# Patient Record
Sex: MALE | Race: WHITE | Employment: FULL TIME | URBAN - METROPOLITAN AREA
[De-identification: names, ages, dates, MRNs, and addresses within clinical notes are randomized per-mention and may not be internally consistent; named-entity substitution may affect disease eponyms.]

---

## 2019-09-29 SDOH — HEALTH STABILITY: MENTAL HEALTH: HOW OFTEN DO YOU HAVE A DRINK CONTAINING ALCOHOL?: NEVER

## 2021-04-02 ENCOUNTER — OFFICE VISIT (OUTPATIENT)
Dept: FAMILY MEDICINE CLINIC | Age: 51
End: 2021-04-02
Payer: COMMERCIAL

## 2021-04-02 VITALS
HEART RATE: 83 BPM | TEMPERATURE: 97 F | DIASTOLIC BLOOD PRESSURE: 90 MMHG | OXYGEN SATURATION: 97 % | BODY MASS INDEX: 27.89 KG/M2 | HEIGHT: 71 IN | SYSTOLIC BLOOD PRESSURE: 130 MMHG | WEIGHT: 199.2 LBS

## 2021-04-02 DIAGNOSIS — E78.5 HYPERLIPIDEMIA, UNSPECIFIED HYPERLIPIDEMIA TYPE: ICD-10-CM

## 2021-04-02 DIAGNOSIS — E78.5 HYPERLIPIDEMIA, UNSPECIFIED HYPERLIPIDEMIA TYPE: Primary | ICD-10-CM

## 2021-04-02 DIAGNOSIS — I10 ESSENTIAL HYPERTENSION: ICD-10-CM

## 2021-04-02 LAB
A/G RATIO: 1.7 (ref 1.1–2.2)
ALBUMIN SERPL-MCNC: 4.5 G/DL (ref 3.4–5)
ALP BLD-CCNC: 36 U/L (ref 40–129)
ALT SERPL-CCNC: 32 U/L (ref 10–40)
ANION GAP SERPL CALCULATED.3IONS-SCNC: 7 MMOL/L (ref 3–16)
AST SERPL-CCNC: 30 U/L (ref 15–37)
BILIRUB SERPL-MCNC: 0.4 MG/DL (ref 0–1)
BILIRUBIN URINE: NEGATIVE
BLOOD, URINE: NEGATIVE
BUN BLDV-MCNC: 14 MG/DL (ref 7–20)
CALCIUM SERPL-MCNC: 9.2 MG/DL (ref 8.3–10.6)
CHLORIDE BLD-SCNC: 102 MMOL/L (ref 99–110)
CHOLESTEROL, TOTAL: 195 MG/DL (ref 0–199)
CLARITY: CLEAR
CO2: 30 MMOL/L (ref 21–32)
COLOR: YELLOW
CREAT SERPL-MCNC: 1 MG/DL (ref 0.9–1.3)
EPITHELIAL CELLS, UA: 0 /HPF (ref 0–5)
GFR AFRICAN AMERICAN: >60
GFR NON-AFRICAN AMERICAN: >60
GLOBULIN: 2.7 G/DL
GLUCOSE BLD-MCNC: 78 MG/DL (ref 70–99)
GLUCOSE URINE: NEGATIVE MG/DL
HDLC SERPL-MCNC: 27 MG/DL (ref 40–60)
HYALINE CASTS: 0 /LPF (ref 0–8)
KETONES, URINE: NEGATIVE MG/DL
LDL CHOLESTEROL CALCULATED: 155 MG/DL
LEUKOCYTE ESTERASE, URINE: NEGATIVE
MICROSCOPIC EXAMINATION: NORMAL
NITRITE, URINE: NEGATIVE
PH UA: 6 (ref 5–8)
POTASSIUM SERPL-SCNC: 5.2 MMOL/L (ref 3.5–5.1)
PROTEIN UA: NEGATIVE MG/DL
RBC UA: 3 /HPF (ref 0–4)
SODIUM BLD-SCNC: 139 MMOL/L (ref 136–145)
SPECIFIC GRAVITY UA: 1.03 (ref 1–1.03)
TOTAL PROTEIN: 7.2 G/DL (ref 6.4–8.2)
TRIGL SERPL-MCNC: 63 MG/DL (ref 0–150)
URINE TYPE: NORMAL
UROBILINOGEN, URINE: 0.2 E.U./DL
VLDLC SERPL CALC-MCNC: 13 MG/DL
WBC UA: 1 /HPF (ref 0–5)

## 2021-04-02 PROCEDURE — 93000 ELECTROCARDIOGRAM COMPLETE: CPT | Performed by: FAMILY MEDICINE

## 2021-04-02 PROCEDURE — 99214 OFFICE O/P EST MOD 30 MIN: CPT | Performed by: FAMILY MEDICINE

## 2021-04-02 RX ORDER — LISINOPRIL AND HYDROCHLOROTHIAZIDE 12.5; 1 MG/1; MG/1
1 TABLET ORAL DAILY
Qty: 90 TABLET | Refills: 1 | Status: SHIPPED | OUTPATIENT
Start: 2021-04-02 | End: 2021-10-21 | Stop reason: SDUPTHER

## 2021-04-02 RX ORDER — ATORVASTATIN CALCIUM 40 MG/1
40 TABLET, FILM COATED ORAL DAILY
Qty: 90 TABLET | Refills: 1 | Status: SHIPPED | OUTPATIENT
Start: 2021-04-02 | End: 2021-10-21 | Stop reason: SDUPTHER

## 2021-04-02 ASSESSMENT — PATIENT HEALTH QUESTIONNAIRE - PHQ9
2. FEELING DOWN, DEPRESSED OR HOPELESS: 0
SUM OF ALL RESPONSES TO PHQ QUESTIONS 1-9: 0
1. LITTLE INTEREST OR PLEASURE IN DOING THINGS: 0

## 2021-04-02 NOTE — PROGRESS NOTES
2021     Dom Haines      Chief Complaint   Patient presents with    Annual Exam     pt states that his BP has been high at work and it was 159/85 and he is concerned since his mom passed away at 46 with heart disease . MELIZA Rios is a 52 y.o. male who presents today with the followin. Hyperlipidemia, unspecified hyperlipidemia type    2. Essential hypertension      He has not been here for years  Has had his blood pressure taken a couple times outside the office and has been high in the 150/90+ range  He concerned because his mother just  at age 47 of an MI  I had the patient on antihypertensive medicine and statin therapy in the past but he has not been compliant and did come back for follow-up      REVIEW OF SYMPTOMS    Review of Systems   Constitutional: Negative for activity change and unexpected weight change. Cardiovascular: Negative for chest pain.        PAST MEDICAL HISTORY  Past Medical History:   Diagnosis Date    Hyperlipidemia        FAMILY HISTORY  Family History   Problem Relation Age of Onset    Diabetes Mother     Heart Attack Mother     Diabetes Father        SOCIAL HISTORY  Social History     Socioeconomic History    Marital status:      Spouse name: None    Number of children: None    Years of education: None    Highest education level: None   Occupational History    Occupation: electrian   Social Needs    Financial resource strain: None    Food insecurity     Worry: None     Inability: None    Transportation needs     Medical: None     Non-medical: None   Tobacco Use    Smoking status: Never Smoker    Smokeless tobacco: Never Used   Substance and Sexual Activity    Alcohol use: Never     Frequency: Never    Drug use: Never    Sexual activity: None   Lifestyle    Physical activity     Days per week: None     Minutes per session: None    Stress: None   Relationships    Social connections     Talks on phone: None     Gets together: None Attends Pentecostalism service: None     Active member of club or organization: None     Attends meetings of clubs or organizations: None     Relationship status: None    Intimate partner violence     Fear of current or ex partner: None     Emotionally abused: None     Physically abused: None     Forced sexual activity: None   Other Topics Concern    None   Social History Narrative    None        SURGICAL HISTORY  History reviewed. No pertinent surgical history. CURRENT MEDICATIONS  Current Outpatient Medications   Medication Sig Dispense Refill    lisinopril-hydroCHLOROthiazide (PRINZIDE;ZESTORETIC) 10-12.5 MG per tablet Take 1 tablet by mouth daily 90 tablet 1    atorvastatin (LIPITOR) 40 MG tablet Take 1 tablet by mouth daily 90 tablet 1     No current facility-administered medications for this visit. ALLERGIES  No Known Allergies    PHYSICAL EXAM    BP (!) 130/90   Pulse 83   Temp 97 °F (36.1 °C)   Ht 5' 11\" (1.803 m)   Wt 199 lb 3.2 oz (90.4 kg)   SpO2 97%   BMI 27.78 kg/m²     Physical Exam  Vitals signs and nursing note reviewed. Constitutional:       Appearance: Normal appearance. Cardiovascular:      Rate and Rhythm: Normal rate. Pulmonary:      Effort: Pulmonary effort is normal.   Neurological:      Mental Status: He is alert. EKG in the office shows a left axis with no ischemic changes      ASSESSMENT and Shanae Charles was seen today for annual exam.    Diagnoses and all orders for this visit:    Hyperlipidemia, unspecified hyperlipidemia type  -     Lipid Panel; Future    Essential hypertension  -     Comprehensive Metabolic Panel; Future  -     Urinalysis with Microscopic  -     22321 - TX ELECTROCARDIOGRAM, COMPLETE  -     EKG 12 lead; Future  -     EKG 12 lead    Other orders  -     lisinopril-hydroCHLOROthiazide (PRINZIDE;ZESTORETIC) 10-12.5 MG per tablet; Take 1 tablet by mouth daily  -     atorvastatin (LIPITOR) 40 MG tablet;  Take 1 tablet by mouth daily    Start patient on lisinopril hydrochlorothiazide  Start atorvastatin  Follow-up in 4 weeks  Recommend Covid vaccine now  Baseline chemistry profile lipids done today urinalysis pending  No follow-ups on file. Electronically signed by Arron Wilder MD on 4/2/2021    Please note that this chart was generated using dragon dictation software. Although every effort was made to ensure the accuracy of this automated transcription, some errors in transcription may have occurred.

## 2021-04-19 ENCOUNTER — OFFICE VISIT (OUTPATIENT)
Dept: FAMILY MEDICINE CLINIC | Age: 51
End: 2021-04-19
Payer: COMMERCIAL

## 2021-04-19 ENCOUNTER — HOSPITAL ENCOUNTER (OUTPATIENT)
Age: 51
Setting detail: SPECIMEN
Discharge: HOME | End: 2021-04-19
Payer: COMMERCIAL

## 2021-04-19 VITALS — TEMPERATURE: 97.3 F | OXYGEN SATURATION: 98 % | HEART RATE: 78 BPM

## 2021-04-19 DIAGNOSIS — J02.9 SORE THROAT: ICD-10-CM

## 2021-04-19 DIAGNOSIS — R51.9 GENERALIZED HEADACHE: ICD-10-CM

## 2021-04-19 DIAGNOSIS — R43.2 LOSS OF TASTE: ICD-10-CM

## 2021-04-19 DIAGNOSIS — R09.81 NASAL CONGESTION: Primary | ICD-10-CM

## 2021-04-19 DIAGNOSIS — R43.0 LOSS OF SMELL: ICD-10-CM

## 2021-04-19 LAB
S PYO AG THROAT QL: NORMAL
SARS-COV-2: DETECTED
SOURCE: ABNORMAL

## 2021-04-19 PROCEDURE — U0003 INFECTIOUS AGENT DETECTION BY NUCLEIC ACID (DNA OR RNA); SEVERE ACUTE RESPIRATORY SYNDROME CORONAVIRUS 2 (SARS-COV-2) (CORONAVIRUS DISEASE [COVID-19]), AMPLIFIED PROBE TECHNIQUE, MAKING USE OF HIGH THROUGHPUT TECHNOLOGIES AS DESCRIBED BY CMS-2020-01-R: HCPCS

## 2021-04-19 PROCEDURE — 99213 OFFICE O/P EST LOW 20 MIN: CPT | Performed by: NURSE PRACTITIONER

## 2021-04-19 PROCEDURE — U0005 INFEC AGEN DETEC AMPLI PROBE: HCPCS

## 2021-04-19 PROCEDURE — 87880 STREP A ASSAY W/OPTIC: CPT | Performed by: NURSE PRACTITIONER

## 2021-04-19 NOTE — PROGRESS NOTES
4/19/21  Dave JESUS MANUEL Zaki  1970    FLU/COVID-19 CLINIC EVALUATION    HPI SYMPTOMS:    Employer:    [] Fevers  [] Chills  [] Cough  [] Coughing up blood  [] Chest Congestion  [x] Nasal Congestion  [] Feeling short of breath  [] Sometimes  [] Frequently  [] All the time  [] Chest pain  [x] Headaches  [x]Tolerable  [] Severe  [x] Sore throat  [] Muscle aches  [] Nausea  [] Vomiting  []Unable to keep fluids down  [] Diarrhea  []Severe    [x] OTHER SYMPTOMS:  Loss of taste and smell  Symptom Duration:   [] 1  [] 2   [] 3   [] 4    [x] 5   [] 6   [] 7   [] 8   [] 9   [] 10   [] 11   [] 12   [] 13   [] 14   [] Longer than 14 days    Symptom course:   [] Worsening     [x] Stable     [] Improving    RISK FACTORS:    [] Pregnant or possibly pregnant  [] Age over 61  [] Diabetes  [] Heart disease  [] Asthma  [] COPD/Other chronic lung diseases  [] Active Cancer  [] On Chemotherapy  [] Taking oral steroids  [] History Lymphoma/Leukemia  [] Close contact with a lab confirmed COVID-19 patient within 14 days of symptom onset  [] History of travel from affected geographical areas within 14 days of symptom onset       VITALS:  There were no vitals filed for this visit. TESTS:    POCT FLU:  [] Positive     []Negative    ASSESSMENT:    [] Flu  [] Possible COVID-19  [] Strep    PLAN:    [] Discharge home with written instructions for:  [] Flu management  [] Possible COVID-19 infection with self-quarantine and management of symptoms  [] Follow-up with primary care physician or emergency department if worsens  [] Evaluation per physician/NP/PA in clinic  [] Sent to ER       An  electronic signature was used to authenticate this note.      --Stefanie Mejía LPN on 2/28/6618 at 34:91 AM

## 2021-04-19 NOTE — PATIENT INSTRUCTIONS
Your COVID 19 test can take 3-5 days for the results to come back. We ask that you make a Mychart page and view your test results this way. You will need to Self quarantine until you know your results. Increase fluids and rest  Saline nasal spray as needed for nasal congestion  Warm salt gargles as needed for throat discomfort  Monitor temperature twice a day  Tylenol as needed for fevers and/or discomfort. Big deep breaths periodically throughout the day  Regular Mucinex over the counter as needed for chest congestion  If symptoms worsen -Go to the ER. Follow up with your primary care provider      To Whom it May Concern:    Alexei Parish was tested for COVID-19 4/19/2021. He/she must stay home until test results are back. If test is positive, he/she must quarantine for a total of 10 days starting from day one of symptom onset. He/she must also be fever-free for 24 hours at that time, and also have improvement in symptoms. We do not recommend retesting as patients may continue to test positive for months even though no longer contagious. It is suggested you call [facility] ([facility]) or [address] [place] [address] with any questions regarding quarantine timeframe/return to work/school details.

## 2021-04-19 NOTE — PROGRESS NOTES
4/19/2021    HPI:  Chief complaint and history of present illness as per medical assistant/nurse documented today in the Flu/COVID-19 clinic. Patient is here with complaints of nasal congestion, headache, sore throat, and loss of smell/taste x 5 days. MEDICATIONS:  Prior to Visit Medications    Medication Sig Taking? Authorizing Provider   lisinopril-hydroCHLOROthiazide (PRINZIDE;ZESTORETIC) 10-12.5 MG per tablet Take 1 tablet by mouth daily  José Manuel Lopez MD   atorvastatin (LIPITOR) 40 MG tablet Take 1 tablet by mouth daily  José Manuel Lopez MD       No Known Allergies,   Past Medical History:   Diagnosis Date    Hyperlipidemia    , History reviewed. No pertinent surgical history. ,   Social History     Tobacco Use    Smoking status: Never Smoker    Smokeless tobacco: Never Used   Substance Use Topics    Alcohol use: Never     Frequency: Never    Drug use: Never   ,   Family History   Problem Relation Age of Onset    Diabetes Mother     Heart Attack Mother     Diabetes Father    ,   There is no immunization history on file for this patient.,   Health Maintenance   Topic Date Due    Hepatitis C screen  Never done    HIV screen  Never done    COVID-19 Vaccine (1) Never done    DTaP/Tdap/Td vaccine (1 - Tdap) Never done    Flu vaccine (Season Ended) 09/01/2021    Lipid screen  04/02/2022    Potassium monitoring  04/02/2022    Creatinine monitoring  04/02/2022    Hepatitis A vaccine  Aged Out    Hepatitis B vaccine  Aged Out    Hib vaccine  Aged Out    Meningococcal (ACWY) vaccine  Aged Out    Pneumococcal 0-64 years Vaccine  Aged Out       PHYSICAL EXAM:  Physical Exam  Constitutional:       Appearance: Normal appearance. HENT:      Head: Normocephalic. Right Ear: Tympanic membrane, ear canal and external ear normal.      Left Ear: Tympanic membrane, ear canal and external ear normal.      Nose: Congestion (slight) present. Mouth/Throat:      Lips: Pink.       Mouth: Mucous membranes are moist.      Pharynx: Posterior oropharyngeal erythema present. Neck:      Musculoskeletal: Neck supple. Cardiovascular:      Rate and Rhythm: Normal rate and regular rhythm. Heart sounds: Normal heart sounds. Pulmonary:      Effort: Pulmonary effort is normal.      Breath sounds: Normal breath sounds. Skin:     General: Skin is warm and dry. Neurological:      Mental Status: He is alert and oriented to person, place, and time. Psychiatric:         Behavior: Behavior normal.         ASSESSMENT/PLAN:  1. Nasal congestion  Your COVID 19 test can take 3-5 days for the results to come back. We ask that you make a Mychart page and view your test results this way. You will need to Self quarantine until you know your results. Increase fluids and rest  Saline nasal spray as needed for nasal congestion  Warm salt gargles as needed for throat discomfort  Monitor temperature twice a day  Tylenol as needed for fevers and/or discomfort. Big deep breaths periodically throughout the day  Regular Mucinex over the counter as needed for chest congestion  If symptoms worsen -Go to the ER. Follow up with your primary care provider  - COVID-19 Ambulatory    2. Generalized headache  - COVID-19 Ambulatory    3. Sore throat  Strep test is negative. - COVID-19 Ambulatory  - POCT rapid strep A    4. Loss of smell  - COVID-19 Ambulatory    5. Loss of taste  - COVID-19 Ambulatory      FOLLOW-UP:  Return if symptoms worsen or fail to improve.     In addition to other information, the printed after visit summary provided to the patient includes:  [x] COVID-19 Self care instructions  [x] COVID-19 General patient information

## 2021-10-21 ENCOUNTER — OFFICE VISIT (OUTPATIENT)
Dept: FAMILY MEDICINE CLINIC | Age: 51
End: 2021-10-21
Payer: COMMERCIAL

## 2021-10-21 VITALS
HEIGHT: 71 IN | SYSTOLIC BLOOD PRESSURE: 134 MMHG | OXYGEN SATURATION: 97 % | DIASTOLIC BLOOD PRESSURE: 82 MMHG | BODY MASS INDEX: 28.08 KG/M2 | HEART RATE: 73 BPM | WEIGHT: 200.6 LBS

## 2021-10-21 DIAGNOSIS — E78.5 HYPERLIPIDEMIA, UNSPECIFIED HYPERLIPIDEMIA TYPE: ICD-10-CM

## 2021-10-21 DIAGNOSIS — Z11.4 SCREENING FOR HUMAN IMMUNODEFICIENCY VIRUS: ICD-10-CM

## 2021-10-21 DIAGNOSIS — Z11.59 NEED FOR HEPATITIS C SCREENING TEST: ICD-10-CM

## 2021-10-21 DIAGNOSIS — I10 ESSENTIAL HYPERTENSION: Primary | ICD-10-CM

## 2021-10-21 PROCEDURE — 99214 OFFICE O/P EST MOD 30 MIN: CPT | Performed by: PHYSICIAN ASSISTANT

## 2021-10-21 RX ORDER — ATORVASTATIN CALCIUM 40 MG/1
40 TABLET, FILM COATED ORAL DAILY
Qty: 90 TABLET | Refills: 1 | Status: SHIPPED | OUTPATIENT
Start: 2021-10-21

## 2021-10-21 RX ORDER — LISINOPRIL AND HYDROCHLOROTHIAZIDE 12.5; 1 MG/1; MG/1
1 TABLET ORAL DAILY
Qty: 90 TABLET | Refills: 1 | Status: SHIPPED | OUTPATIENT
Start: 2021-10-21

## 2021-10-21 NOTE — PROGRESS NOTES
10/21/2021    Lonnell Meigs Wilcoxon    Chief Complaint   Patient presents with    6 Month Follow-Up    Medication Refill       HPI  History obtained from the patient. Suhas Anderson is a 54 y.o. male who presents today for 6-month follow-up. HTN - Patient is compliant with Lisinopril-Hydrochlorothiazide 10-12.5 mg. Patient denies home blood pressure monitoring. Patient denies HA, vision changes, lightheadedness. HLD - Patient is compliant with Atorvastatin 40 mg. Health maintenance - Patient politely declines colon cancer screening until next year. Patient also politely declines flu and Tdap vaccines at this time. The 10-year ASCVD risk score (Luigi Bell., et al., 2013) is: 6.3%    Values used to calculate the score:      Age: 54 years      Sex: Male      Is Non- : No      Diabetic: No      Tobacco smoker: No      Systolic Blood Pressure: 367 mmHg      Is BP treated: No      HDL Cholesterol: 27 mg/dL      Total Cholesterol: 195 mg/dL    REVIEW OF SYMPTOMS  Review of Systems   Constitutional: Negative for chills and fever. Respiratory: Negative for cough and shortness of breath. Gastrointestinal: Negative for diarrhea and vomiting.      PAST MEDICAL HISTORY  Past Medical History:   Diagnosis Date    Hyperlipidemia        FAMILY HISTORY  Family History   Problem Relation Age of Onset    Diabetes Mother     Heart Attack Mother     Diabetes Father        SOCIAL HISTORY  Social History     Socioeconomic History    Marital status:      Spouse name: Not on file    Number of children: Not on file    Years of education: Not on file    Highest education level: Not on file   Occupational History    Occupation: electrian   Tobacco Use    Smoking status: Never Smoker    Smokeless tobacco: Never Used   Substance and Sexual Activity    Alcohol use: Never    Drug use: Never    Sexual activity: Not on file   Other Topics Concern    Not on file   Social History Narrative    Not on file     Social Determinants of Health     Financial Resource Strain:     Difficulty of Paying Living Expenses:    Food Insecurity:     Worried About Running Out of Food in the Last Year:     [address] in the Last Year:    Transportation Needs:     Lack of Transportation (Medical):  Lack of Transportation (Non-Medical):    Physical Activity:     Days of Exercise per Week:     Minutes of Exercise per Session:    Stress:     Feeling of Stress :    Social Connections:     Frequency of Communication with Friends and Family:     Frequency of Social Gatherings with Friends and Family:     Attends [employer]orationist Services:     Active Member of Clubs or Organizations:     Attends Club or Organization Meetings:     Marital Status:    Intimate Partner Violence:     Fear of Current or Ex-Partner:     Emotionally Abused:     Physically Abused:     Sexually Abused:         SURGICAL HISTORY  No past surgical history on file. CURRENT MEDICATIONS  Current Outpatient Medications   Medication Sig Dispense Refill    atorvastatin (LIPITOR) 40 MG tablet Take 1 tablet by mouth daily 90 tablet 1    lisinopril-hydroCHLOROthiazide (PRINZIDE;ZESTORETIC) 10-12.5 MG per tablet Take 1 tablet by mouth daily 90 tablet 1     No current facility-administered medications for this visit. ALLERGIES  No Known Allergies    RECENT LABS    No results found for: LABA1C  No results found for: EAG    Lab Results   Component Value Date    CHOL 195 04/02/2021     Lab Results   Component Value Date    LDLCALC 155 (H) 04/02/2021       No results found for: WBC, HGB, HCT, MCV, PLT    PHYSICAL EXAM  /82   Pulse 73   Ht 5' 11\" (1.803 m)   Wt 200 lb 9.6 oz (91 kg)   SpO2 97%   BMI 27.98 kg/m²     Physical Exam  Constitutional:       Appearance: Normal appearance. HENT:      Head: Normocephalic and atraumatic. Eyes:      Comments: EOM grossly intact.    Cardiovascular:      Rate and Rhythm: Normal rate and regular rhythm. Heart sounds: No murmur heard. No friction rub. No gallop. Pulmonary:      Effort: Pulmonary effort is normal.      Breath sounds: Normal breath sounds. No wheezing, rhonchi or rales. Skin:     General: Skin is warm and dry. Neurological:      Mental Status: He is alert and oriented to person, place, and time. Comments: Cranial nerves II-XII grossly intact   Psychiatric:         Mood and Affect: Mood normal.         Behavior: Behavior normal.         ASSESSMENT & PLAN  1. Essential hypertension  - Well-controlled. Refilled medications and encouraged home monitoring.  - CBC Auto Differential; Future  - Comprehensive Metabolic Panel; Future  - lisinopril-hydroCHLOROthiazide (PRINZIDE;ZESTORETIC) 10-12.5 MG per tablet; Take 1 tablet by mouth daily  Dispense: 90 tablet; Refill: 1    2. Hyperlipidemia, unspecified hyperlipidemia type  - Discussed ASCVD risk score with patient. Continue on current dosage since his ASCVD score is <7.5%. Refilled medication.  - CBC Auto Differential; Future  - Comprehensive Metabolic Panel; Future  - Lipid Panel; Future  - atorvastatin (LIPITOR) 40 MG tablet; Take 1 tablet by mouth daily  Dispense: 90 tablet; Refill: 1    3. Need for hepatitis C screening test  Health maintenance requirement. Patient is agreeable to screening.   - Hepatitis C Antibody; Future    4. Screening for human immunodeficiency virus  Health maintenance requirement. Patient is agreeable to screening.   - HIV Screen; Future    Return in about 6 months (around 4/21/2022).             Electronically signed by Liz Acosta PA-C on 10/21/2021

## 2021-11-12 DIAGNOSIS — R35.0 FREQUENCY OF MICTURITION: Primary | ICD-10-CM

## 2023-01-11 DIAGNOSIS — I10 ESSENTIAL HYPERTENSION: ICD-10-CM

## 2023-01-11 DIAGNOSIS — E78.5 HYPERLIPIDEMIA, UNSPECIFIED HYPERLIPIDEMIA TYPE: ICD-10-CM

## 2023-01-12 RX ORDER — LISINOPRIL AND HYDROCHLOROTHIAZIDE 12.5; 1 MG/1; MG/1
1 TABLET ORAL DAILY
Qty: 90 TABLET | Refills: 1 | Status: SHIPPED | OUTPATIENT
Start: 2023-01-12

## 2023-01-12 RX ORDER — ATORVASTATIN CALCIUM 40 MG/1
40 TABLET, FILM COATED ORAL DAILY
Qty: 90 TABLET | Refills: 1 | Status: SHIPPED | OUTPATIENT
Start: 2023-01-12

## 2023-01-17 ENCOUNTER — OFFICE VISIT (OUTPATIENT)
Dept: FAMILY MEDICINE CLINIC | Age: 53
End: 2023-01-17
Payer: COMMERCIAL

## 2023-01-17 ENCOUNTER — TELEPHONE (OUTPATIENT)
Dept: FAMILY MEDICINE CLINIC | Age: 53
End: 2023-01-17

## 2023-01-17 VITALS
SYSTOLIC BLOOD PRESSURE: 132 MMHG | OXYGEN SATURATION: 96 % | HEIGHT: 71 IN | DIASTOLIC BLOOD PRESSURE: 86 MMHG | BODY MASS INDEX: 28.36 KG/M2 | HEART RATE: 92 BPM | WEIGHT: 202.6 LBS

## 2023-01-17 DIAGNOSIS — R39.15 URINARY URGENCY: ICD-10-CM

## 2023-01-17 DIAGNOSIS — Z79.890 LONG-TERM CURRENT USE OF TESTOSTERONE REPLACEMENT THERAPY: ICD-10-CM

## 2023-01-17 DIAGNOSIS — I10 ESSENTIAL HYPERTENSION: ICD-10-CM

## 2023-01-17 DIAGNOSIS — Z12.11 SCREENING FOR COLON CANCER: ICD-10-CM

## 2023-01-17 DIAGNOSIS — R07.89 CHEST PRESSURE: ICD-10-CM

## 2023-01-17 DIAGNOSIS — Z82.41 FAMILY HISTORY OF SUDDEN CARDIAC DEATH: ICD-10-CM

## 2023-01-17 DIAGNOSIS — R10.84 GENERALIZED ABDOMINAL PAIN: ICD-10-CM

## 2023-01-17 DIAGNOSIS — E78.5 HYPERLIPIDEMIA, UNSPECIFIED HYPERLIPIDEMIA TYPE: Primary | ICD-10-CM

## 2023-01-17 DIAGNOSIS — F41.9 ANXIETY: ICD-10-CM

## 2023-01-17 DIAGNOSIS — R53.83 FATIGUE, UNSPECIFIED TYPE: ICD-10-CM

## 2023-01-17 DIAGNOSIS — R35.1 NOCTURIA: ICD-10-CM

## 2023-01-17 PROCEDURE — G8427 DOCREV CUR MEDS BY ELIG CLIN: HCPCS | Performed by: PHYSICIAN ASSISTANT

## 2023-01-17 PROCEDURE — G8484 FLU IMMUNIZE NO ADMIN: HCPCS | Performed by: PHYSICIAN ASSISTANT

## 2023-01-17 PROCEDURE — G8419 CALC BMI OUT NRM PARAM NOF/U: HCPCS | Performed by: PHYSICIAN ASSISTANT

## 2023-01-17 PROCEDURE — 99215 OFFICE O/P EST HI 40 MIN: CPT | Performed by: PHYSICIAN ASSISTANT

## 2023-01-17 PROCEDURE — 3017F COLORECTAL CA SCREEN DOC REV: CPT | Performed by: PHYSICIAN ASSISTANT

## 2023-01-17 PROCEDURE — 93000 ELECTROCARDIOGRAM COMPLETE: CPT | Performed by: PHYSICIAN ASSISTANT

## 2023-01-17 PROCEDURE — 3074F SYST BP LT 130 MM HG: CPT | Performed by: PHYSICIAN ASSISTANT

## 2023-01-17 PROCEDURE — 1036F TOBACCO NON-USER: CPT | Performed by: PHYSICIAN ASSISTANT

## 2023-01-17 PROCEDURE — 3078F DIAST BP <80 MM HG: CPT | Performed by: PHYSICIAN ASSISTANT

## 2023-01-17 RX ORDER — HYDROXYZINE HYDROCHLORIDE 25 MG/1
25 TABLET, FILM COATED ORAL EVERY 8 HOURS PRN
Qty: 30 TABLET | Refills: 0 | Status: SHIPPED | OUTPATIENT
Start: 2023-01-17

## 2023-01-17 NOTE — TELEPHONE ENCOUNTER
Patient transferred from [address] - patient states he is having back and abdominal pain and SOB for a week - he states he has been off his high blood pressure medication but just started it back up a few days ago. There is a previous telephone encounter where Marino Rodriguez advises the patient to be scheduled for a same day here in the office but with the chest pain to go to the ED. Patient stated he is at work this morning and would like a late appointment here in the office - he is scheduled today, 1/17/23 @3pm with Norma Chan.

## 2023-01-17 NOTE — PROGRESS NOTES
1/18/2023    Dave Haines    Chief Complaint   Patient presents with    Abdominal Pain     Pt reports abdominal pain rad into lower back , x's 1 week     Shortness of Breath     Pt reports some slight chest pressure with exertion    Other     Pt reports was using injectable steroids x's 4 yrs , recently stopped 3 weeks prior     Hypertension     Pt reports not taking bp meds        HPI  History was obtained from patient.   Dave \"DJ\" is a 52 y.o. male who presents today with multiple concerns.  He is having a lot of anxiety related to his unknown state of health.  He is requesting \"a full work-up going into the new year.\"    Admittedly, patient has been using a few different forms of testosterone 3 times per week for the last 5 years.  He states he has been using \"1 cc of Testosterone Propionate, Nesterone, and \"Tren\" three times weekly.\"  He gets it from a friend.  Has never had his Testosterone checked.  He states that he quit entirely, cold turkey, 3 weeks ago.    Patient also reports that 1 year ago, he was drinking 3 Monster energy drinks daily, cut down to 1 energy drink each morning over the last year.  Quit energy drinks entirely, cold turkey, 3 weeks ago.    Patient states that he was at work last Thursday.  Works at [employer].  He started to feel unwell so his blood pressure was checked and was recorded at 168/105.  He has a well-established history of hyperlipidemia and hypertension.  Previously on atorvastatin 40 mg daily and lisinopril/hydrochlorothiazide 10/12.5 mg daily.  He states that he stopped these meds on his own approximately 1 year ago.  He restarted both medications again 4 days ago.  Blood pressure 132/86 in office today.    Patient states that he goes to the gym and works out pretty regularly.  1 day last week, he was benching and felt like he could not get a good breath in.  He states he was not struggling to breathe but he just did not feel right.  This happened again while  benching the other day. He has had a few days of generalized pain, mostly in the abdomen. He states that this then radiates to his upper back and low back. He describes it as a burning type pain. Denies tasting acid in the throat, excessive throat clearing, globus sensation, or obvious heartburn. No known history of GERD. He denies chest pain, jaw pain, arm pain, weakness of the extremities, lightheadedness, dizziness, nausea, headaches or vision changes. He denies palpitations, racing heart. He reports normal bowel movements without diarrhea, constipation, bloody or black stools. He states that he has been experiencing urinary urgency for months. He also awakens at night to urinate. He denies dysuria, hematuria, penile discharge, flank pain, testicular pain or swelling. He admits fatigue for 1 to 2 years. Denies any known history of electrolyte or vitamin abnormalities, anemia, thyroid disease or testosterone deficiency. He denies fevers, chills, weight loss. His mother  of sudden cardiac death at the age of 46, requests Cariology referral today. He is approaching 54, and this is causing anxiousness. He realizes he should not be using Testosterone that is not prescribed to him. He denies any depressive component. He denies thoughts of harming himself or others. DJ states that he recently got . His wife is really worried about him. She is a good support system for him. She has been begging him to stop testosterone and caffeine. He has never had a colonoscopy, requests GI referral today.     PAST MEDICAL HISTORY  Past Medical History:   Diagnosis Date    Hyperlipidemia        FAMILY HISTORY  Family History   Problem Relation Age of Onset    Diabetes Mother     Heart Attack Mother     Diabetes Father        SOCIAL HISTORY  Social History     Socioeconomic History    Marital status:      Spouse name: None    Number of children: None    Years of education: None Highest education level: None   Occupational History    Occupation: electrian   Tobacco Use    Smoking status: Never    Smokeless tobacco: Never   Substance and Sexual Activity    Alcohol use: Never    Drug use: Never        SURGICAL HISTORY  History reviewed. No pertinent surgical history. CURRENT MEDICATIONS  Current Outpatient Medications   Medication Sig Dispense Refill    hydrOXYzine HCl (ATARAX) 25 MG tablet Take 1 tablet by mouth every 8 hours as needed for Anxiety 30 tablet 0    atorvastatin (LIPITOR) 40 MG tablet Take 1 tablet by mouth daily 90 tablet 1    lisinopril-hydroCHLOROthiazide (PRINZIDE;ZESTORETIC) 10-12.5 MG per tablet Take 1 tablet by mouth daily 90 tablet 1     No current facility-administered medications for this visit. ALLERGIES  No Known Allergies    PHYSICAL EXAM    /86   Pulse 92   Ht 5' 11\" (1.803 m)   Wt 202 lb 9.6 oz (91.9 kg)   SpO2 96%   BMI 28.26 kg/m²     Constitutional:  Well developed, well nourished. Pleasant and cooperative. No acute distress but is a bit anxious at times. HENT:  Normocephalic, atraumatic  Eyes:  PERRLA, conjunctiva normal, no discharge, no scleral icterus  Neck:  No tenderness, supple  Lymphatic:  No lymphadenopathy noted  Cardiovascular:  Normal heart rate, normal rhythm, no murmurs, gallops or rubs  Thorax & Lungs:  Normal breath sounds, no respiratory distress, no wheezing, no rales, no rhonchi  Abdomen:  Soft, mild diffuse tenderness to palpation. No localized areas of tenderness. Special tests negative. No palpable mass or organomegaly. No distention.   Skin:  Warm, dry, no erythema, no rash  Back:  No tenderness, No CVA tenderness  Extremities:  No edema, no tenderness, no cyanosis  Musculoskeletal:  Good range of motion all major joints  Neurologic:  Alert & oriented X 3, normal motor function, normal sensory function, no focal deficits noted  Psychiatric:  Affect normal, mood normal, a bit anxious at times, hopeful    ASSESSMENT & PLAN    Anny Tuttle was seen today for abdominal pain, shortness of breath, other and hypertension. Diagnoses and all orders for this visit:    Hyperlipidemia, unspecified hyperlipidemia type  -     [facility]  -     Lipid Panel; Future    Essential hypertension  -     [facility]  -     CBC with Auto Differential; Future  -     Comprehensive Metabolic Panel; Future    Chest pressure  -     [facility]  -     EKG [address] Performed    Family history of sudden cardiac death  -     [facility]    Anxiety  -     hydrOXYzine HCl (ATARAX) 25 MG tablet; Take 1 tablet by mouth every 8 hours as needed for Anxiety    Nocturia  -     PSA Screening; Future  -     Culture, Urine    Urinary urgency  -     Culture, Urine    Fatigue, unspecified type  -     Testosterone; Future  -     TSH; Future  -     Vitamin B12; Future  -     Vitamin D 25 Hydroxy; Future    Generalized abdominal pain  -     CT ABDOMEN PELVIS W IV CONTRAST Additional Contrast? Radiologist Recommendation; Future    Screening for colon cancer  -     Heladio Lazo MD, Gastroenterology, [place]    History of long-term abuse of testosterone replacement therapy-see HPI. Was using excessively for approximately 5 years. Quit cold turkey 3 weeks ago. Was getting it from a friend.        EKG with concerns for Atrial Flutter  Family hx Sudden Cardiac Death in mother  Refer to Cardiology  Continue atorvastatin 40 mg daily  Continue lisinopril/hydrochlorothiazide 10/12.5 mg daily  Initiate hydroxyzine 25 mg 3 times daily as needed for anxiousness  No working or driving on hydroxyzine due to potential drowsiness  Continue to avoid caffeine and Testosterone  Obtain blood work- CBC, CMP, TSH, vitamin D, vitamin B12, testosterone, PSA, fasting lipid panel  Urine culture pending  Obtain CT abdomen and pelvis  Refer to gastroenterology for colonoscopy, screening for colon cancer  ED for any sudden changes or additional symptoms    There are no discontinued medications. No follow-ups on file. Patient verbalizes understanding with the above plan and is in agreement. Patient will call with  questions or concerns. More than 1 hour was spent on this encounter with at least half of that time spent face-to-face. Please note that this chart was generated using dragon dictation software. Although every effort was made to ensure the accuracy of this automated transcription, some errors in transcription may have occurred.     Electronically signed by Lam Matos PA-C on 1/18/2023

## 2023-01-18 LAB — URINE CULTURE, ROUTINE: NORMAL

## 2023-01-20 DIAGNOSIS — I10 ESSENTIAL HYPERTENSION: ICD-10-CM

## 2023-01-20 DIAGNOSIS — R35.1 NOCTURIA: ICD-10-CM

## 2023-01-20 DIAGNOSIS — E78.5 HYPERLIPIDEMIA, UNSPECIFIED HYPERLIPIDEMIA TYPE: ICD-10-CM

## 2023-01-20 DIAGNOSIS — R53.83 FATIGUE, UNSPECIFIED TYPE: ICD-10-CM

## 2023-01-20 LAB
A/G RATIO: 1.8 (ref 1.1–2.2)
ALBUMIN SERPL-MCNC: 4.8 G/DL (ref 3.4–5)
ALP BLD-CCNC: 62 U/L (ref 40–129)
ALT SERPL-CCNC: 99 U/L (ref 10–40)
ANION GAP SERPL CALCULATED.3IONS-SCNC: 12 MMOL/L (ref 3–16)
AST SERPL-CCNC: 43 U/L (ref 15–37)
BASOPHILS ABSOLUTE: 0 K/UL (ref 0–0.2)
BASOPHILS RELATIVE PERCENT: 0.9 %
BILIRUB SERPL-MCNC: 1.3 MG/DL (ref 0–1)
BUN BLDV-MCNC: 22 MG/DL (ref 7–20)
CALCIUM SERPL-MCNC: 9.9 MG/DL (ref 8.3–10.6)
CHLORIDE BLD-SCNC: 99 MMOL/L (ref 99–110)
CHOLESTEROL, TOTAL: 199 MG/DL (ref 0–199)
CO2: 28 MMOL/L (ref 21–32)
CREAT SERPL-MCNC: 1.1 MG/DL (ref 0.9–1.3)
EOSINOPHILS ABSOLUTE: 0.2 K/UL (ref 0–0.6)
EOSINOPHILS RELATIVE PERCENT: 4.7 %
GFR SERPL CREATININE-BSD FRML MDRD: >60 ML/MIN/{1.73_M2}
GLUCOSE BLD-MCNC: 88 MG/DL (ref 70–99)
HCT VFR BLD CALC: 58.7 % (ref 40.5–52.5)
HDLC SERPL-MCNC: 27 MG/DL (ref 40–60)
HEMOGLOBIN: 19 G/DL (ref 13.5–17.5)
LDL CHOLESTEROL CALCULATED: 155 MG/DL
LYMPHOCYTES ABSOLUTE: 1.1 K/UL (ref 1–5.1)
LYMPHOCYTES RELATIVE PERCENT: 21.5 %
MCH RBC QN AUTO: 29.9 PG (ref 26–34)
MCHC RBC AUTO-ENTMCNC: 32.4 G/DL (ref 31–36)
MCV RBC AUTO: 92.2 FL (ref 80–100)
MONOCYTES ABSOLUTE: 0.5 K/UL (ref 0–1.3)
MONOCYTES RELATIVE PERCENT: 9.1 %
NEUTROPHILS ABSOLUTE: 3.4 K/UL (ref 1.7–7.7)
NEUTROPHILS RELATIVE PERCENT: 63.8 %
PDW BLD-RTO: 13.8 % (ref 12.4–15.4)
PLATELET # BLD: 206 K/UL (ref 135–450)
PMV BLD AUTO: 9.8 FL (ref 5–10.5)
POTASSIUM SERPL-SCNC: 4.9 MMOL/L (ref 3.5–5.1)
PROSTATE SPECIFIC ANTIGEN: 1.54 NG/ML (ref 0–4)
RBC # BLD: 6.36 M/UL (ref 4.2–5.9)
SODIUM BLD-SCNC: 139 MMOL/L (ref 136–145)
TOTAL PROTEIN: 7.5 G/DL (ref 6.4–8.2)
TRIGL SERPL-MCNC: 83 MG/DL (ref 0–150)
TSH SERPL DL<=0.05 MIU/L-ACNC: 1.57 UIU/ML (ref 0.27–4.2)
VITAMIN B-12: 1280 PG/ML (ref 211–911)
VITAMIN D 25-HYDROXY: 63.9 NG/ML
VLDLC SERPL CALC-MCNC: 17 MG/DL
WBC # BLD: 5.3 K/UL (ref 4–11)

## 2023-01-24 LAB — TESTOSTERONE TOTAL: 250 NG/DL (ref 220–1000)

## 2023-01-26 ENCOUNTER — OFFICE VISIT (OUTPATIENT)
Dept: FAMILY MEDICINE CLINIC | Age: 53
End: 2023-01-26
Payer: COMMERCIAL

## 2023-01-26 VITALS
HEIGHT: 71 IN | OXYGEN SATURATION: 95 % | DIASTOLIC BLOOD PRESSURE: 80 MMHG | SYSTOLIC BLOOD PRESSURE: 136 MMHG | WEIGHT: 199 LBS | RESPIRATION RATE: 18 BRPM | BODY MASS INDEX: 27.86 KG/M2 | HEART RATE: 78 BPM

## 2023-01-26 DIAGNOSIS — E78.00 PURE HYPERCHOLESTEROLEMIA: ICD-10-CM

## 2023-01-26 DIAGNOSIS — R10.84 GENERALIZED ABDOMINAL PAIN: ICD-10-CM

## 2023-01-26 DIAGNOSIS — R79.89 LFT ELEVATION: ICD-10-CM

## 2023-01-26 DIAGNOSIS — I10 PRIMARY HYPERTENSION: Primary | ICD-10-CM

## 2023-01-26 DIAGNOSIS — G47.10 HYPERSOMNOLENCE: ICD-10-CM

## 2023-01-26 DIAGNOSIS — Z12.11 COLON CANCER SCREENING: ICD-10-CM

## 2023-01-26 DIAGNOSIS — R17 ELEVATED BILIRUBIN: ICD-10-CM

## 2023-01-26 DIAGNOSIS — K21.9 GASTROESOPHAGEAL REFLUX DISEASE, UNSPECIFIED WHETHER ESOPHAGITIS PRESENT: ICD-10-CM

## 2023-01-26 PROCEDURE — G8427 DOCREV CUR MEDS BY ELIG CLIN: HCPCS | Performed by: PHYSICIAN ASSISTANT

## 2023-01-26 PROCEDURE — 3075F SYST BP GE 130 - 139MM HG: CPT | Performed by: PHYSICIAN ASSISTANT

## 2023-01-26 PROCEDURE — G8484 FLU IMMUNIZE NO ADMIN: HCPCS | Performed by: PHYSICIAN ASSISTANT

## 2023-01-26 PROCEDURE — 3017F COLORECTAL CA SCREEN DOC REV: CPT | Performed by: PHYSICIAN ASSISTANT

## 2023-01-26 PROCEDURE — 99215 OFFICE O/P EST HI 40 MIN: CPT | Performed by: PHYSICIAN ASSISTANT

## 2023-01-26 PROCEDURE — 3079F DIAST BP 80-89 MM HG: CPT | Performed by: PHYSICIAN ASSISTANT

## 2023-01-26 PROCEDURE — 1036F TOBACCO NON-USER: CPT | Performed by: PHYSICIAN ASSISTANT

## 2023-01-26 PROCEDURE — G8419 CALC BMI OUT NRM PARAM NOF/U: HCPCS | Performed by: PHYSICIAN ASSISTANT

## 2023-01-26 RX ORDER — OMEPRAZOLE 40 MG/1
40 CAPSULE, DELAYED RELEASE ORAL
Qty: 90 CAPSULE | Refills: 0 | Status: SHIPPED | OUTPATIENT
Start: 2023-01-26 | End: 2023-04-26

## 2023-01-26 NOTE — PATIENT INSTRUCTIONS
Try to take your cholesterol medicine Atorvastatin (Lipitor) 40 mg at NIGHT. Your liver enzymes are a little high. Consider:  Avoid alcohol  Limit use of NSAID (ibuprofen) or naproxen and Tylenol  Work on lowering cholesterol    To lower cholesterol: Limit RED MEAT (beef, steak, pork) and foods with high saturated fats (BUTTER, OIL, ALL FRIED FOODS, and CHEESE). Try to eat lots of vegetables and lean proteins/good fats, such as fish, avocado, nuts, etc. Try to eat lots of FIBER (vegetables, beans, whole grains like bran and oatmeal, etc). Weight loss and regular EXERCISE will make a significant difference, as well. Let's recheck your Cholesterol and liver enzymes again in 2-3 months. FASTING blood work. Call and schedule a sleep study:     [facility]  [address]  [address] [facility], [address] [facility],[address]  [address]  [phone]    Call and schedule appointment with GI to discuss colon cancer screening AND elevated bilirubin:     [facility] - Morro Porter MD   [facility] [address], [address], [address] [facility],[address]   [phone]    I recommend oatmeal (low or no sugar) with almond or cashew butter and beryl seeds (for omega 3 content)     Acid Reflux - Try avoiding tomatoes for the next 6-8 weeks

## 2023-01-26 NOTE — PROGRESS NOTES
JESUS MANUEL 1/26/2023    Dave Haines    Chief Complaint   Patient presents with    Other     Presenting for routine office visit. Last routine office visit was in 2021. He did see Nestor Guadalupe for acute issues recently. Will see cardio Monday for chest pain and hypertension. Immunizations     No flu shot. HPI  History obtained from the patient. Magda Tavarez is a 50 y.o. male who presents today for routine office visit. Chest Pain - Patient was referred to cardiology and has an appointment Monday. He has a family history of sudden cardiac death. His mother passed away at 46years old due to a heart attack. GERD - Patient describes the pain in his chest as burning and states that its worst with eating, \"everything I eat, \"no foods in particular. HTN - Patient has been without lisinopril-hydrochlorothiazide 10-12.5 mg daily for about a year but restarted this within the last 2 weeks. HLD - Patient just restartedAtorvastatin 40 mg daily. Patient states that throughout the week, he tries eat very healthy, mostly chicken and broccoli. On the weekends, he sometimes splurges. Patient is a never smoker. Concern for REBEKAH - Patient does admit loud snoring. He is tired throughout the day. Nocturia - PSA and urine culture were negative. LFT Elevation - Patient drinks 6 at the most during the weekend. Patient does take ibuprofen about three times weekly     Health Maintenance - Patient's care gaps include COLON CA SCREEN (patient was referred to GI for this). The 10-year ASCVD risk score (Christy RG, et al., 2019) is: 8.5%    Values used to calculate the score:      Age: 50 years      Sex: Male      Is Non- : No      Diabetic: No      Tobacco smoker: No      Systolic Blood Pressure: 791 mmHg      Is BP treated: Yes      HDL Cholesterol: 27 mg/dL      Total Cholesterol: 199 mg/dL    REVIEW OF SYMPTOMS  Review of Systems   Constitutional: Negative for chills and fever. Respiratory: Negative for cough and shortness of breath. Gastrointestinal: Negative for diarrhea and vomiting. PAST MEDICAL HISTORY  Past Medical History:   Diagnosis Date    Hyperlipidemia        FAMILY HISTORY  Family History   Problem Relation Age of Onset    Diabetes Mother     Heart Attack Mother     Diabetes Father        SOCIAL HISTORY  Social History     Socioeconomic History    Marital status:      Spouse name: None    Number of children: None    Years of education: None    Highest education level: None   Occupational History    Occupation: electrian   Tobacco Use    Smoking status: Never    Smokeless tobacco: Never   Substance and Sexual Activity    Alcohol use: Never    Drug use: Never        SURGICAL HISTORY  No past surgical history on file. CURRENT MEDICATIONS  Current Outpatient Medications   Medication Sig Dispense Refill    omeprazole (PRILOSEC) 40 MG delayed release capsule Take 1 capsule by mouth every morning (before breakfast) 90 capsule 0    hydrOXYzine HCl (ATARAX) 25 MG tablet Take 1 tablet by mouth every 8 hours as needed for Anxiety 30 tablet 0    atorvastatin (LIPITOR) 40 MG tablet Take 1 tablet by mouth daily 90 tablet 1    lisinopril-hydroCHLOROthiazide (PRINZIDE;ZESTORETIC) 10-12.5 MG per tablet Take 1 tablet by mouth daily 90 tablet 1     No current facility-administered medications for this visit.        ALLERGIES  No Known Allergies    RECENT LABS    No results found for: LABA1C  No results found for: EAG    Lab Results   Component Value Date    CHOL 199 01/20/2023    CHOL 195 04/02/2021     Lab Results   Component Value Date    LDLCALC 155 (H) 01/20/2023    LDLCALC 155 (H) 04/02/2021       Lab Results   Component Value Date    WBC 5.3 01/20/2023    HGB 19.0 (H) 01/20/2023    HCT 58.7 (H) 01/20/2023    MCV 92.2 01/20/2023     01/20/2023       PHYSICAL EXAM  /80   Pulse 78   Resp 18   Ht 5' 11\" (1.803 m)   Wt 199 lb (90.3 kg)   SpO2 95%   BMI 27.75 kg/m²     Physical Exam  Constitutional:       Appearance: Normal appearance. HENT:      Head: Normocephalic and atraumatic. Eyes:      Comments: EOM grossly intact. Cardiovascular:      Rate and Rhythm: Normal rate and regular rhythm. Heart sounds: No murmur heard. No friction rub. No gallop. Pulmonary:      Effort: Pulmonary effort is normal.      Breath sounds: Normal breath sounds. No wheezing, rhonchi or rales. Skin:     General: Skin is warm and dry. Neurological:      Mental Status: He is alert and oriented to person, place, and time. Comments: Cranial nerves II-XII grossly intact   Psychiatric:         Mood and Affect: Mood normal.         Behavior: Behavior normal.       ASSESSMENT & PLAN  1. Gastroesophageal reflux disease, unspecified whether esophagitis present  Patient complains of burning pain in his chest when eating. He has been taking Prilosec 20 mg over-the-counter without much relief. Sent prescription for Prilosec 40 mg for the patient to take daily. Instructed him to avoid acid reflux triggers. He states that he has been eating a lot of salsa, so we will have him cut back on this, as well. Patient does have a referral to GI for a variety of issues including abdominal pain, elevated bilirubin, and COLON CANCER SCREENING discussion.  - omeprazole (PRILOSEC) 40 MG delayed release capsule; Take 1 capsule by mouth every morning (before breakfast)  Dispense: 90 capsule; Refill: 0    2. Primary hypertension  Currently well controlled. Continue taking medication. 3. Pure hypercholesterolemia  Discussed diet and lifestyle with the patient. He recently restarted his statin, so we need to give this some time before rechecking his fasting lipid panel.  - Lipid Panel; Future  - Comprehensive Metabolic Panel; Future    4. Hypersomnolence  Referred patient for sleep study.  - [address]    5.  LFT elevation  Discussed diet and lifestyle with the patient, encouraging him to avoid alcohol, limit NSAIDs/acetaminophen, and start working on getting his cholesterol under control.  - Comprehensive Metabolic Panel; Future    6. Elevated bilirubin  Referred to GI.  - Comprehensive Metabolic Panel; Future  - [place] Au CNP, Gastroenterology, [place]    7. Generalized abdominal pain  Referred to GI.  - Sarai Au CNP, Gastroenterology, [place]    8. Colon cancer screening  Refer to Joanne Au CNP, Gastroenterology, [place]    I spent 60 minutes on this encounter, reviewing patient records, gathering history, examining the patient, and documenting. We went into a detailed discussion about diet (avoiding acid reflux triggers, limiting saturated fats in order to improve cholesterol, etc.). Return in about 3 months (around 4/26/2023).             Electronically signed by Judie Sood PA-C on 1/26/2023

## 2023-01-27 ENCOUNTER — TELEPHONE (OUTPATIENT)
Dept: GASTROENTEROLOGY | Age: 53
End: 2023-01-27

## 2023-01-27 NOTE — TELEPHONE ENCOUNTER
Called pt. In regards to a referral for abd pain, elevated bilirubin, GERD and a colon screening. Lm for pt to call back to make an appt.

## 2023-01-30 ENCOUNTER — HOSPITAL ENCOUNTER (OUTPATIENT)
Dept: GENERAL RADIOLOGY | Age: 53
Discharge: HOME | End: 2023-01-30
Payer: COMMERCIAL

## 2023-01-30 ENCOUNTER — HOSPITAL ENCOUNTER (OUTPATIENT)
Age: 53
Discharge: HOME | End: 2023-01-30
Payer: COMMERCIAL

## 2023-01-30 ENCOUNTER — OFFICE VISIT (OUTPATIENT)
Dept: CARDIOLOGY CLINIC | Age: 53
End: 2023-01-30
Payer: COMMERCIAL

## 2023-01-30 VITALS
HEART RATE: 65 BPM | WEIGHT: 196 LBS | SYSTOLIC BLOOD PRESSURE: 136 MMHG | BODY MASS INDEX: 27.44 KG/M2 | DIASTOLIC BLOOD PRESSURE: 86 MMHG | HEIGHT: 71 IN

## 2023-01-30 DIAGNOSIS — R07.9 CHEST PAIN, UNSPECIFIED TYPE: Primary | ICD-10-CM

## 2023-01-30 DIAGNOSIS — E78.00 PURE HYPERCHOLESTEROLEMIA: ICD-10-CM

## 2023-01-30 DIAGNOSIS — I10 PRIMARY HYPERTENSION: ICD-10-CM

## 2023-01-30 DIAGNOSIS — R94.31 ABNORMAL EKG: ICD-10-CM

## 2023-01-30 DIAGNOSIS — Z82.49 FAMILY HISTORY OF PREMATURE CAD: ICD-10-CM

## 2023-01-30 DIAGNOSIS — Z01.810 PRE-OPERATIVE CARDIOVASCULAR EXAMINATION: ICD-10-CM

## 2023-01-30 LAB
ABO/RH: NORMAL
ANION GAP SERPL CALCULATED.3IONS-SCNC: 9 MMOL/L (ref 4–16)
ANTIBODY SCREEN: NEGATIVE
BASOPHILS ABSOLUTE: 0.1 K/CU MM
BASOPHILS RELATIVE PERCENT: 1 % (ref 0–1)
BUN BLDV-MCNC: 22 MG/DL (ref 6–23)
CALCIUM SERPL-MCNC: 9.5 MG/DL (ref 8.3–10.6)
CHLORIDE BLD-SCNC: 98 MMOL/L (ref 99–110)
CO2: 29 MMOL/L (ref 21–32)
COMMENT: NORMAL
CREAT SERPL-MCNC: 0.9 MG/DL (ref 0.9–1.3)
DIFFERENTIAL TYPE: ABNORMAL
EOSINOPHILS ABSOLUTE: 0.3 K/CU MM
EOSINOPHILS RELATIVE PERCENT: 3.7 % (ref 0–3)
GFR SERPL CREATININE-BSD FRML MDRD: >60 ML/MIN/1.73M2
GLUCOSE FASTING: 97 MG/DL (ref 70–99)
HCT VFR BLD CALC: 50.8 % (ref 42–52)
HEMOGLOBIN: 17.2 GM/DL (ref 13.5–18)
IMMATURE NEUTROPHIL %: 0.1 % (ref 0–0.43)
LYMPHOCYTES ABSOLUTE: 1.8 K/CU MM
LYMPHOCYTES RELATIVE PERCENT: 26.2 % (ref 24–44)
MCH RBC QN AUTO: 30 PG (ref 27–31)
MCHC RBC AUTO-ENTMCNC: 33.9 % (ref 32–36)
MCV RBC AUTO: 88.5 FL (ref 78–100)
MONOCYTES ABSOLUTE: 0.6 K/CU MM
MONOCYTES RELATIVE PERCENT: 8.7 % (ref 0–4)
PDW BLD-RTO: 12.3 % (ref 11.7–14.9)
PLATELET # BLD: 196 K/CU MM (ref 140–440)
PMV BLD AUTO: 10.4 FL (ref 7.5–11.1)
POTASSIUM SERPL-SCNC: 4 MMOL/L (ref 3.5–5.1)
RBC # BLD: 5.74 M/CU MM (ref 4.6–6.2)
SEGMENTED NEUTROPHILS ABSOLUTE COUNT: 4 K/CU MM
SEGMENTED NEUTROPHILS RELATIVE PERCENT: 60.3 % (ref 36–66)
SODIUM BLD-SCNC: 136 MMOL/L (ref 135–145)
TOTAL IMMATURE NEUTOROPHIL: 0.01 K/CU MM
WBC # BLD: 6.7 K/CU MM (ref 4–10.5)

## 2023-01-30 PROCEDURE — 99203 OFFICE O/P NEW LOW 30 MIN: CPT | Performed by: INTERNAL MEDICINE

## 2023-01-30 PROCEDURE — G8419 CALC BMI OUT NRM PARAM NOF/U: HCPCS | Performed by: INTERNAL MEDICINE

## 2023-01-30 PROCEDURE — 36415 COLL VENOUS BLD VENIPUNCTURE: CPT

## 2023-01-30 PROCEDURE — 86900 BLOOD TYPING SEROLOGIC ABO: CPT

## 2023-01-30 PROCEDURE — G8427 DOCREV CUR MEDS BY ELIG CLIN: HCPCS | Performed by: INTERNAL MEDICINE

## 2023-01-30 PROCEDURE — 3075F SYST BP GE 130 - 139MM HG: CPT | Performed by: INTERNAL MEDICINE

## 2023-01-30 PROCEDURE — 86850 RBC ANTIBODY SCREEN: CPT

## 2023-01-30 PROCEDURE — 85025 COMPLETE CBC W/AUTO DIFF WBC: CPT

## 2023-01-30 PROCEDURE — 80048 BASIC METABOLIC PNL TOTAL CA: CPT

## 2023-01-30 PROCEDURE — G8484 FLU IMMUNIZE NO ADMIN: HCPCS | Performed by: INTERNAL MEDICINE

## 2023-01-30 PROCEDURE — 86901 BLOOD TYPING SEROLOGIC RH(D): CPT

## 2023-01-30 PROCEDURE — 3079F DIAST BP 80-89 MM HG: CPT | Performed by: INTERNAL MEDICINE

## 2023-01-30 PROCEDURE — 71046 X-RAY EXAM CHEST 2 VIEWS: CPT

## 2023-01-30 PROCEDURE — 93000 ELECTROCARDIOGRAM COMPLETE: CPT | Performed by: INTERNAL MEDICINE

## 2023-01-30 RX ORDER — ASPIRIN 81 MG/1
81 TABLET ORAL DAILY
Qty: 90 TABLET | Refills: 1 | Status: SHIPPED | OUTPATIENT
Start: 2023-01-30

## 2023-01-30 NOTE — PROGRESS NOTES
CARDIOLOGY CONSULTATION    Michael Patel  1970    Dear Dr. Josie Schulte, PAYouC    Thank you for asking me to participate in care of Michael Patel    Chief Complaint   Patient presents with    Consultation    Chest Pain     Consult for mid sternal chest pain,lasting hours off and on, some SOB,no dizziness,swelling to ankles, or palpitations. History of present illness:  Michael Patel is a 50 y.o. male is seeing me for the first time for abnormal EKG which was done on 2023 by PCP. Patient gives me history of chest pressure and tightness first noticed when he was working out with weights 3 weeks ago and he has not returned to gym since then. He denies any nausea vomiting or diaphoresis. He was checked by PCP and EKG was done which was missed read by computer as atrial flutter and he was asked to see me for cardiology consultation. He denies any palpitations syncope or near syncope. He is mom  with heart attack at age of 46. He has hypertension hyperlipidemia for which she did not take any medications for a year and recently started on these medications. He is taking low-dose aspirin. He says the day he had discomfort it was 10 on a scale of 1-10 today he has some discomfort on a scale of 1-10 it was 5. We gave him a nitroglycerin sublingual which did not seem to make much difference. He points out to mostly lower sternum and epigastric and upper abdomen area. He denies any nausea vomiting. Denies any bowel irregularities. He does not smoke. He exercise regularly and does weightlifting and stays active but has not done so since he started having the symptoms he is afraid to do exercises.     REVIEW OF SYSTEMS:   Constitutional: Denies generalized weakness  Eyes:  Denies change in visual acuity  HENT:  Denies nasal congestion or sore throat  Respiratory:  Denies cough or shortness of breath  Cardiovascular: as in HPI  GI:  Denies abdominal pain, complains of nausea and vomiting  :  Denies dysuria  Musculoskeletal:  Denies back pain or joint pain  Integument:  Denies rash  Neurologic:  Denies headache, focal weakness or sensory changes  \"Remaining review of systems reviewed and negative. Past medical history:  has a past medical history of Abnormal EKG, Family history of premature CAD, and Hyperlipidemia. Past surgical history:  has no past surgical history on file. Social History:   Social History     Tobacco Use    Smoking status: Never    Smokeless tobacco: Never   Substance Use Topics    Alcohol use: Never     Family history: family history includes Diabetes in his father and mother; Heart Attack in his mother. No Known Allergies  Prior to Admission medications    Medication Sig Start Date End Date Taking? Authorizing Provider   aspirin EC 81 MG EC tablet Take 1 tablet by mouth daily 1/30/23  Yes Baldomero Nageotte, MD   omeprazole (PRILOSEC) 40 MG delayed release capsule Take 1 capsule by mouth every morning (before breakfast) 1/26/23 4/26/23 Yes Fam Omer PA-C   hydrOXYzine HCl (ATARAX) 25 MG tablet Take 1 tablet by mouth every 8 hours as needed for Anxiety 1/17/23  Yes Carter Dalton PA-C   atorvastatin (LIPITOR) 40 MG tablet Take 1 tablet by mouth daily 1/12/23  Yes Fam Omer PA-C   lisinopril-hydroCHLOROthiazide (PRINZIDE;ZESTORETIC) 10-12.5 MG per tablet Take 1 tablet by mouth daily 1/12/23  Yes Vincenzo Roth     Physical Examination:    Vitals:    01/30/23 1430   BP: 136/86   Pulse: 65   Weight: 196 lb (88.9 kg)   Height: 5' 11\" (1.803 m)      Body mass index is 27.34 kg/m². Wt Readings from Last 3 Encounters:   01/30/23 196 lb (88.9 kg)   01/26/23 199 lb (90.3 kg)   01/17/23 202 lb 9.6 oz (91.9 kg)     Constitutional: Well built muscular young male in no apparent distress  Eyes: Pulls are equal no conjunctival pallor noted  ENT: Unremarkable  NECK: No JVP or thyromegaly  Cardiovascular: Auscultation: Normal S1 and S2.   No murmurs or gallops noted. Carotids are negative for bruits. Abdominal aorta is nonpalpable. No epigastric bruit noted. Peripheral pulses: Femorals radials and pedal's are 2+. Respiratory:  Respiratory effort is normal.  Breath sounds are clear to auscultation  Extremities:  No edema, clubbing,  Cyanosis, petechiae. SKIN: Warm and well perfused, no pallor or cyanosis  Abdomen:  No masses or tenderness. No organomegaly noted. Neurologic:  Oriented to time, place, and person and non-anxious. No focal neurological deficit noted. Psychiatric: Normal mood and effect. LAB REVIEW:  CBC:   Lab Results   Component Value Date/Time    WBC 5.3 01/20/2023 08:39 AM    HGB 19.0 01/20/2023 08:39 AM    HCT 58.7 01/20/2023 08:39 AM     01/20/2023 08:39 AM     Lipids:   Lab Results   Component Value Date    CHOL 199 01/20/2023    TRIG 83 01/20/2023    HDL 27 (L) 01/20/2023    LDLCALC 155 (H) 01/20/2023     Renal:   Lab Results   Component Value Date/Time    BUN 22 01/30/2023 03:50 PM    CREATININE 0.9 01/30/2023 03:50 PM     01/30/2023 03:50 PM    K 4.0 01/30/2023 03:50 PM     PT/INR: No results found for: INR  No results found for: LABA1C    The 10-year ASCVD risk score (Christy RG, et al., 2019) is: 8.5%    Values used to calculate the score:      Age: 50 years      Sex: Male      Is Non- : No      Diabetic: No      Tobacco smoker: No      Systolic Blood Pressure: 378 mmHg      Is BP treated: Yes      HDL Cholesterol: 27 mg/dL      Total Cholesterol: 199 mg/dL    EKG: Normal sinus rhythm 65 bpm.  EKG from January 17, 2023 is reviewed showed T wave inversions in inferior leads which were new compared to previous EKG from 2021. Assessment / Recommendations:    Primary hypertension  Well-controlled on Prinzide 10-12 0.5 continue the same.      Chest pain  Suspect cardiac in etiology and in light of multiple risk factors and strong family history and abnormal EKG in the dear he had more symptoms I recommend aggressive invasive evaluation by cardiac catheterization. Details are discussed and multiple questions are answered. Is scheduled to have cardiac cath in the morning by Dr. Suleman Mike. If he has any recurrence of severe chest discomfort at night he can call 911 and come to emergency room at [address]. I gave him an option of getting admitted for unstable angina however he rather have it done tomorrow as an outpatient at this point. Multiple questions related to cardiac cath and coronary artery disease are addressed and he verbalized understanding. Family history of premature CAD  Recommend aggressive risk factor modification lifestyle changes for primary prevention. Pure hypercholesterolemia  Patient started on Lipitor 40 mg a day as his LDL was 155. Have a follow-up lipids done along with LFT in 6 to 8 weeks. Recommend cardiac cath for definitive evaluation. Patient is instructed with regard to the usage of sublingual nitroglycerin on as needed basis for symptoms of angina or angina equivalent. Patient to sit down and rest if symptoms occur with activity and then take one NTG under the tongue and wait for symptoms to resolve. If no relief in 5 minutes one can repeat the dose. If not better within 5 minutes one can take third dose and call 911 if symptoms are not improved or resolved for further care. Headache and drop in blood pressure are common side effects. One may not to take second or third dose if dizzy or light headed due to lower blood pressure and call 911 immediately. For men it is advisable not to use NTG if they had had Viagra or similar medications in the last 24-48 hours as it may result life threatening drop in blood pressure. Multiple questions answered. Patient verbalizes understanding and asks relevant questions. Follow up in 1-2 weeks, sooner if needed.     Kriss Loza MD, 1/30/2023 5:40 PM     Please note this report has been produced using speech recognition software and may contain errors related to that system including errors in grammar, punctuation, and spelling, as well as words and phrases that may be inappropriate.  If there are any questions or concerns please feel free to contact the dictating provider for clarification

## 2023-01-30 NOTE — ASSESSMENT & PLAN NOTE
Patient started on Lipitor 40 mg a day as his LDL was 155. Have a follow-up lipids done along with LFT in 6 to 8 weeks.

## 2023-01-30 NOTE — ASSESSMENT & PLAN NOTE
Suspect cardiac in etiology and in light of multiple risk factors and strong family history and abnormal EKG in the dear he had more symptoms I recommend aggressive invasive evaluation by cardiac catheterization. Details are discussed and multiple questions are answered. Is scheduled to have cardiac cath in the morning by Dr. Suleman Mike. If he has any recurrence of severe chest discomfort at night he can call 911 and come to emergency room at [address]. I gave him an option of getting admitted for unstable angina however he rather have it done tomorrow as an outpatient at this point. Multiple questions related to cardiac cath and coronary artery disease are addressed and he verbalized understanding.

## 2023-01-30 NOTE — PATIENT INSTRUCTIONS
Recommend cardiac cath for definitive evaluation. Patient is instructed with regard to the usage of sublingual nitroglycerin on as needed basis for symptoms of angina or angina equivalent. Patient to sit down and rest if symptoms occur with activity and then take one NTG under the tongue and wait for symptoms to resolve. If no relief in 5 minutes one can repeat the dose. If not better within 5 minutes one can take third dose and call 911 if symptoms are not improved or resolved for further care. Headache and drop in blood pressure are common side effects. One may not to take second or third dose if dizzy or light headed due to lower blood pressure and call 911 immediately. For men it is advisable not to use NTG if they had had Viagra or similar medications in the last 24-48 hours as it may result life threatening drop in blood pressure. Multiple questions answered. Patient verbalizes understanding and asks relevant questions. Follow up in 1-2 weeks, sooner if needed.

## 2023-01-31 ENCOUNTER — HOSPITAL ENCOUNTER (OUTPATIENT)
Dept: CARDIAC CATH/INVASIVE PROCEDURES | Age: 53
Discharge: HOME | End: 2023-01-31
Attending: INTERNAL MEDICINE | Admitting: INTERNAL MEDICINE
Payer: COMMERCIAL

## 2023-01-31 VITALS
HEART RATE: 65 BPM | WEIGHT: 196 LBS | OXYGEN SATURATION: 98 % | RESPIRATION RATE: 16 BRPM | SYSTOLIC BLOOD PRESSURE: 117 MMHG | HEIGHT: 71 IN | BODY MASS INDEX: 27.44 KG/M2 | TEMPERATURE: 96.5 F | DIASTOLIC BLOOD PRESSURE: 77 MMHG

## 2023-01-31 PROCEDURE — 93458 L HRT ARTERY/VENTRICLE ANGIO: CPT

## 2023-01-31 PROCEDURE — 6360000004 HC RX CONTRAST MEDICATION

## 2023-01-31 PROCEDURE — 6370000000 HC RX 637 (ALT 250 FOR IP): Performed by: INTERNAL MEDICINE

## 2023-01-31 PROCEDURE — 2580000003 HC RX 258: Performed by: INTERNAL MEDICINE

## 2023-01-31 PROCEDURE — 93454 CORONARY ARTERY ANGIO S&I: CPT | Performed by: INTERNAL MEDICINE

## 2023-01-31 PROCEDURE — C1769 GUIDE WIRE: HCPCS

## 2023-01-31 PROCEDURE — 2500000003 HC RX 250 WO HCPCS

## 2023-01-31 PROCEDURE — 2709999900 HC NON-CHARGEABLE SUPPLY

## 2023-01-31 PROCEDURE — C1894 INTRO/SHEATH, NON-LASER: HCPCS

## 2023-01-31 PROCEDURE — 6360000002 HC RX W HCPCS

## 2023-01-31 RX ORDER — DIPHENHYDRAMINE HCL 25 MG
25 TABLET ORAL ONCE
Status: COMPLETED | OUTPATIENT
Start: 2023-01-31 | End: 2023-01-31

## 2023-01-31 RX ORDER — SODIUM CHLORIDE 9 MG/ML
INJECTION, SOLUTION INTRAVENOUS CONTINUOUS
Status: DISCONTINUED | OUTPATIENT
Start: 2023-01-31 | End: 2023-01-31 | Stop reason: HOSPADM

## 2023-01-31 RX ORDER — DIAZEPAM 5 MG/1
5 TABLET ORAL ONCE
Status: COMPLETED | OUTPATIENT
Start: 2023-01-31 | End: 2023-01-31

## 2023-01-31 RX ADMIN — SODIUM CHLORIDE: 9 INJECTION, SOLUTION INTRAVENOUS at 08:25

## 2023-01-31 RX ADMIN — DIPHENHYDRAMINE HYDROCHLORIDE 25 MG: 25 TABLET ORAL at 08:25

## 2023-01-31 RX ADMIN — DIAZEPAM 5 MG: 5 TABLET ORAL at 08:25

## 2023-01-31 NOTE — PROCEDURES
Indication: 77-year-old old male with family history of severe premature coronary artery disease who is referred for urgent coronary angiography due to inferior EKG changes and ongoing chest pain for past 1 week. Sedation: Versed and fentanyl    Estimated blood loss: 5 cc    ASA: 2    Mallampati score: 2    Access: Right radial artery using modified Seldinger technique. Left coronary artery was engaged using a 5 [employer] Felecia Grace catheter. Right coronary artery was engaged using a 5 [employer] Felecia Grace catheter. Left ventriculography was performed using a 5 [employer] Felecia Grace cath    Coronary angiogram findings:    Dominance: Right dominant system  Left main artery: Normal caliber vessel, free of significant disease  Left anterior descending artery: Type IV LAD, free of significant disease. It gives a proximal large diagonal branch. It also gives a second diagonal artery in its mid segment. Both of these vessels are free of significant disease  Ramus intermedius: There is a small ramus intermedius with mild disease. Left circumflex artery: Normal caliber vessel, free of significant disease  Right coronary artery: Dominant right coronary artery, free of significant disease. Left ventriculography: LVEDP was noted to be 7 mmHg. Arteriotomy closure: TR band    Complications: None    Conclusion:  No significant coronary artery disease noted. Normal LV end-diastolic pressure. Recommendations:  No significant coronary artery disease therefore his chest pain is likely from noncardiac causes.   Further work-up per medical team.      Shay Rhodes MD

## 2023-01-31 NOTE — PLAN OF CARE
All discharge instructions explained to the patient at this time. No bleeding or hematoma noted along site. Patient walked to the bathroom without difficulty and IV removed per discharge orders. Patient denies any additional needs and all vitals stable for discharge home.   [address] 1/31/2023

## 2023-01-31 NOTE — H&P
Gokul Sanchez, 50 y.o., male    Primary care physician:  Sussy Lima PA-C     Chief Complaint: Chest Pain    History of Present Illness: 74-year-old male with very strong family history of coronary artery disease who was seen in office by Dr. Justus Doherty and was noted to have inferior EKG changes and ongoing chest discomfort. He is now referred for coronary angiography. Past medical history:    has a past medical history of Abnormal EKG, Family history of premature CAD, and Hyperlipidemia. Past surgical history:   has no past surgical history on file. Social History:   reports that he has never smoked. He has never used smokeless tobacco. He reports that he does not drink alcohol and does not use drugs. Family history:  family history includes Diabetes in his father and mother; Heart Attack in his mother. Allergies:    No Known Allergies    Review of systems:     Review of Systems:   Constitutional: No Fever or Weight Loss   Eyes: No Decreased Vision  ENT: No Headaches, Hearing Loss or Vertigo  Cardiovascular: As per HPI  Respiratory: As per HPI  Gastrointestinal: No abdominal pain, appetite loss, blood in stools, constipation, diarrhea or heartburn  Genitourinary: No dysuria, trouble voiding, or hematuria  Musculoskeletal:  No gait disturbance, weakness or joint complaints  Integumentary: No rash or pruritis  Neurological: No TIA or stroke symptoms  Psychiatric: No anxiety or depression  Endocrine: No malaise, fatigue or temperature intolerance  Hematologic/Lymphatic: No bleeding problems, blood clots or swollen lymph nodes  Allergic/Immunologic: No nasal congestion or hives        Physical Examination:    BP (!) 154/86   Pulse 78   Temp (!) 96.5 °F (35.8 °C)   Resp 16   Ht 5' 11\" (1.803 m)   Wt 196 lb (88.9 kg)   SpO2 97%   BMI 27.34 kg/m²    General Appearance:  No distress, conversant    Constitutional:  No acute distress, Non-toxic appearance.     HENT:  Normocephalic, Atraumatic, Bilateral external ears normal, Oropharynx moist,   Eyes:  PERRL, EOMI, Conjunctiva normal, No discharge. Respiratory:  Normal breath sounds, No respiratory distress, No wheezing  Cardiovascular: S1, S2, no murmurs, gallops. JVD wnl  Abdomen /GI:  Bowel sounds normal, Soft, No tenderness   Genitourinary: No costovertebral angle tenderness   Musculoskeletal:  No edema, no tenderness, no deformities. Integument:  Well hydrated, no rash   Neurologic:  Alert & oriented x 3, no focal deficits noted       Lab Review   Recent Labs     01/30/23  1550   WBC 6.7   HGB 17.2   HCT 50.8         Recent Labs     01/30/23  1550      K 4.0   CL 98*   CO2 29   BUN 22   CREATININE 0.9     No results for input(s): AST, ALT, ALB, BILIDIR, BILITOT, ALKPHOS in the last 72 hours. No results for input(s): TROPONINI in the last 72 hours. No results found for: INR, PROTIME  No results found for: BNP      Assessment:    Active Problems:    * No active hospital problems. *     ASA : 2 , Mallampati class II      Plan:   1. Chest Pain/ Possible Angina: Alternate and risks versus benefits were discussed in detail. We will plan cardiac catheterization. We  discussed the risk of but not limited to  potential kidney failure, emergent surgery,blood transfusion  transfusion, infection, potential even death. Patient is in agreement and wants to proceed.     Elle Morton MD

## 2023-01-31 NOTE — FLOWSHEET NOTE
Pt to pt  in wheelchair per volunteer for d/c home in private vehicle with spouse. Right radial site free of bleeding/hematoma at time of d/c

## 2023-02-01 ENCOUNTER — HOSPITAL ENCOUNTER (OUTPATIENT)
Dept: CT IMAGING | Age: 53
Discharge: HOME | End: 2023-02-01
Payer: COMMERCIAL

## 2023-02-01 DIAGNOSIS — R10.84 GENERALIZED ABDOMINAL PAIN: ICD-10-CM

## 2023-02-01 PROCEDURE — 74177 CT ABD & PELVIS W/CONTRAST: CPT

## 2023-02-01 PROCEDURE — 6360000004 HC RX CONTRAST MEDICATION: Performed by: PHYSICIAN ASSISTANT

## 2023-02-01 RX ADMIN — IOPAMIDOL 100 ML: 755 INJECTION, SOLUTION INTRAVENOUS at 18:17

## 2023-02-03 ENCOUNTER — TELEPHONE (OUTPATIENT)
Dept: GASTROENTEROLOGY | Age: 53
End: 2023-02-03

## 2023-02-03 NOTE — TELEPHONE ENCOUNTER
Called pt. In regards to a referral for elevated bilirubin, abd pain and a colon screening. Pt. Stated he was feeling better and wanted to hold off on the cscope for right now but he will call back to cee.

## 2023-03-07 RX ORDER — ASPIRIN 81 MG/1
81 TABLET ORAL DAILY
Qty: 90 TABLET | Refills: 1 | OUTPATIENT
Start: 2023-03-07

## 2023-04-27 ENCOUNTER — OFFICE VISIT (OUTPATIENT)
Dept: FAMILY MEDICINE CLINIC | Age: 53
End: 2023-04-27
Payer: COMMERCIAL

## 2023-04-27 VITALS
RESPIRATION RATE: 16 BRPM | SYSTOLIC BLOOD PRESSURE: 124 MMHG | HEIGHT: 71 IN | WEIGHT: 202 LBS | BODY MASS INDEX: 28.28 KG/M2 | OXYGEN SATURATION: 98 % | HEART RATE: 79 BPM | DIASTOLIC BLOOD PRESSURE: 72 MMHG

## 2023-04-27 DIAGNOSIS — I10 PRIMARY HYPERTENSION: Primary | ICD-10-CM

## 2023-04-27 DIAGNOSIS — R79.89 LFT ELEVATION: ICD-10-CM

## 2023-04-27 DIAGNOSIS — E29.1 HYPOGONADISM IN MALE: ICD-10-CM

## 2023-04-27 DIAGNOSIS — K21.9 GASTROESOPHAGEAL REFLUX DISEASE, UNSPECIFIED WHETHER ESOPHAGITIS PRESENT: ICD-10-CM

## 2023-04-27 DIAGNOSIS — E78.5 HYPERLIPIDEMIA, UNSPECIFIED HYPERLIPIDEMIA TYPE: ICD-10-CM

## 2023-04-27 DIAGNOSIS — Z23 NEED FOR DIPHTHERIA-TETANUS-PERTUSSIS (TDAP) VACCINE: ICD-10-CM

## 2023-04-27 DIAGNOSIS — Z11.59 NEED FOR HEPATITIS C SCREENING TEST: ICD-10-CM

## 2023-04-27 DIAGNOSIS — Z11.4 SCREENING FOR HUMAN IMMUNODEFICIENCY VIRUS: ICD-10-CM

## 2023-04-27 DIAGNOSIS — R17 ELEVATED BILIRUBIN: ICD-10-CM

## 2023-04-27 PROCEDURE — 3078F DIAST BP <80 MM HG: CPT | Performed by: PHYSICIAN ASSISTANT

## 2023-04-27 PROCEDURE — 1036F TOBACCO NON-USER: CPT | Performed by: PHYSICIAN ASSISTANT

## 2023-04-27 PROCEDURE — G8427 DOCREV CUR MEDS BY ELIG CLIN: HCPCS | Performed by: PHYSICIAN ASSISTANT

## 2023-04-27 PROCEDURE — G8419 CALC BMI OUT NRM PARAM NOF/U: HCPCS | Performed by: PHYSICIAN ASSISTANT

## 2023-04-27 PROCEDURE — 99214 OFFICE O/P EST MOD 30 MIN: CPT | Performed by: PHYSICIAN ASSISTANT

## 2023-04-27 PROCEDURE — 90471 IMMUNIZATION ADMIN: CPT | Performed by: PHYSICIAN ASSISTANT

## 2023-04-27 PROCEDURE — 3017F COLORECTAL CA SCREEN DOC REV: CPT | Performed by: PHYSICIAN ASSISTANT

## 2023-04-27 PROCEDURE — 90715 TDAP VACCINE 7 YRS/> IM: CPT | Performed by: PHYSICIAN ASSISTANT

## 2023-04-27 PROCEDURE — 3074F SYST BP LT 130 MM HG: CPT | Performed by: PHYSICIAN ASSISTANT

## 2023-04-27 SDOH — ECONOMIC STABILITY: FOOD INSECURITY: WITHIN THE PAST 12 MONTHS, THE FOOD YOU BOUGHT JUST DIDN'T LAST AND YOU DIDN'T HAVE MONEY TO GET MORE.: NEVER TRUE

## 2023-04-27 SDOH — ECONOMIC STABILITY: FOOD INSECURITY: WITHIN THE PAST 12 MONTHS, YOU WORRIED THAT YOUR FOOD WOULD RUN OUT BEFORE YOU GOT MONEY TO BUY MORE.: NEVER TRUE

## 2023-04-27 SDOH — ECONOMIC STABILITY: INCOME INSECURITY: HOW HARD IS IT FOR YOU TO PAY FOR THE VERY BASICS LIKE FOOD, HOUSING, MEDICAL CARE, AND HEATING?: NOT HARD AT ALL

## 2023-04-27 SDOH — ECONOMIC STABILITY: HOUSING INSECURITY
IN THE LAST 12 MONTHS, WAS THERE A TIME WHEN YOU DID NOT HAVE A STEADY PLACE TO SLEEP OR SLEPT IN A SHELTER (INCLUDING NOW)?: NO

## 2023-04-27 ASSESSMENT — PATIENT HEALTH QUESTIONNAIRE - PHQ9
SUM OF ALL RESPONSES TO PHQ QUESTIONS 1-9: 0
SUM OF ALL RESPONSES TO PHQ QUESTIONS 1-9: 0
2. FEELING DOWN, DEPRESSED OR HOPELESS: 0
SUM OF ALL RESPONSES TO PHQ QUESTIONS 1-9: 0
SUM OF ALL RESPONSES TO PHQ9 QUESTIONS 1 & 2: 0
SUM OF ALL RESPONSES TO PHQ QUESTIONS 1-9: 0
1. LITTLE INTEREST OR PLEASURE IN DOING THINGS: 0

## 2023-04-27 NOTE — PATIENT INSTRUCTIONS
BLOOD WORK  Please come back for FASTING blood work sometime within the next 2 weeks if possible. Be sure to fast for at least 12 HOURS before having your blood work drawn. Drink plenty of water before coming in - this will make it easier to draw your blood. You may also have black coffee or black tea (NO CREAM OR SUGAR) if you wish. [facility]   [id number] [address], [place] [address]  Monday through Friday 8:00am - 12:30pm and 1:00pm - 4:00pm     [facility]  [address], [facility], [address]  Monday through Friday 7:00am - 5:00pm   Saturday 8:00am - 12:00pm     Keep an eye on [employer] for a result note from me within a few days of getting your blood work. If you do not have MyChart, we will call you with results. Please call and schedule an appointment:     [facility] - Kaycee Braxton CNP   [address], [address]   [facility], [address]   [address] [facility]   [address]   [facility], [address]   [phone]     Testosterone Dosage and Monitoring:   Per Up to date guidelines: \"We recommend for most men doses of 50 to 100 mg every week or 100 to 200 mg every two weeks. \"    FOR EXAMPLE: If you have 250 mg/mL testosterone, you would want to do 0.2-0.4 mL ONCE WEEKLY    If you are doing WEEKLY injection, come for blood work 3-4 days after your last injection. If you are doing BIWEEKLY injection (every 14 days), then come 1 week after your last injection. If you have a question about your dosing, please call or send me a [employer] message.

## 2023-05-13 DIAGNOSIS — E78.5 HYPERLIPIDEMIA, UNSPECIFIED HYPERLIPIDEMIA TYPE: ICD-10-CM

## 2023-05-13 DIAGNOSIS — I10 ESSENTIAL HYPERTENSION: ICD-10-CM

## 2023-05-15 RX ORDER — LISINOPRIL AND HYDROCHLOROTHIAZIDE 12.5; 1 MG/1; MG/1
1 TABLET ORAL DAILY
Qty: 90 TABLET | Refills: 1 | Status: SHIPPED | OUTPATIENT
Start: 2023-05-15

## 2023-05-15 RX ORDER — ASPIRIN 81 MG/1
81 TABLET ORAL DAILY
Qty: 90 TABLET | Refills: 1 | OUTPATIENT
Start: 2023-05-15

## 2023-05-15 RX ORDER — ATORVASTATIN CALCIUM 40 MG/1
40 TABLET, FILM COATED ORAL DAILY
Qty: 90 TABLET | Refills: 1 | Status: SHIPPED | OUTPATIENT
Start: 2023-05-15

## 2023-07-17 ENCOUNTER — TELEPHONE (OUTPATIENT)
Dept: FAMILY MEDICINE CLINIC | Age: 53
End: 2023-07-17

## 2023-07-17 DIAGNOSIS — I10 ESSENTIAL HYPERTENSION: ICD-10-CM

## 2023-07-17 DIAGNOSIS — E78.5 HYPERLIPIDEMIA, UNSPECIFIED HYPERLIPIDEMIA TYPE: ICD-10-CM

## 2023-07-17 RX ORDER — ATORVASTATIN CALCIUM 40 MG/1
40 TABLET, FILM COATED ORAL DAILY
Qty: 90 TABLET | Refills: 1 | Status: SHIPPED | OUTPATIENT
Start: 2023-07-17

## 2023-07-17 RX ORDER — LISINOPRIL AND HYDROCHLOROTHIAZIDE 12.5; 1 MG/1; MG/1
1 TABLET ORAL DAILY
Qty: 90 TABLET | Refills: 1 | Status: SHIPPED | OUTPATIENT
Start: 2023-07-17

## 2023-07-17 NOTE — TELEPHONE ENCOUNTER
If his pain is truly a 10, he needs to go to the ED. If it's bearable, then we can reschedule a same day if he wishes.

## 2023-07-17 NOTE — TELEPHONE ENCOUNTER
Patient called nurse triage stating he's had lower left sided back pain and numbness for about 1 week now which radiates down his left leg. He had an appt scheduled with andrew today but he was unable to make it. He said about 3-4 days ago he felt numbness and tingling in both hands. He has taken ibuprofen which does seem to help, the pain and numbness comes and goes but when it does come the pain level is about a 10.

## 2023-07-20 ENCOUNTER — OFFICE VISIT (OUTPATIENT)
Dept: FAMILY MEDICINE CLINIC | Age: 53
End: 2023-07-20
Payer: COMMERCIAL

## 2023-07-20 VITALS
OXYGEN SATURATION: 97 % | DIASTOLIC BLOOD PRESSURE: 78 MMHG | SYSTOLIC BLOOD PRESSURE: 132 MMHG | WEIGHT: 202.4 LBS | BODY MASS INDEX: 28.34 KG/M2 | HEART RATE: 75 BPM | RESPIRATION RATE: 16 BRPM | HEIGHT: 71 IN

## 2023-07-20 DIAGNOSIS — G57.02 PIRIFORMIS SYNDROME OF LEFT SIDE: Primary | ICD-10-CM

## 2023-07-20 DIAGNOSIS — K21.9 GASTROESOPHAGEAL REFLUX DISEASE, UNSPECIFIED WHETHER ESOPHAGITIS PRESENT: ICD-10-CM

## 2023-07-20 PROCEDURE — 3075F SYST BP GE 130 - 139MM HG: CPT | Performed by: PHYSICIAN ASSISTANT

## 2023-07-20 PROCEDURE — 99213 OFFICE O/P EST LOW 20 MIN: CPT | Performed by: PHYSICIAN ASSISTANT

## 2023-07-20 PROCEDURE — G8427 DOCREV CUR MEDS BY ELIG CLIN: HCPCS | Performed by: PHYSICIAN ASSISTANT

## 2023-07-20 PROCEDURE — 3078F DIAST BP <80 MM HG: CPT | Performed by: PHYSICIAN ASSISTANT

## 2023-07-20 PROCEDURE — 3017F COLORECTAL CA SCREEN DOC REV: CPT | Performed by: PHYSICIAN ASSISTANT

## 2023-07-20 PROCEDURE — G8419 CALC BMI OUT NRM PARAM NOF/U: HCPCS | Performed by: PHYSICIAN ASSISTANT

## 2023-07-20 PROCEDURE — 1036F TOBACCO NON-USER: CPT | Performed by: PHYSICIAN ASSISTANT

## 2023-07-20 PROCEDURE — 96372 THER/PROPH/DIAG INJ SC/IM: CPT | Performed by: PHYSICIAN ASSISTANT

## 2023-07-20 RX ORDER — HYDROCODONE BITARTRATE AND ACETAMINOPHEN 5; 325 MG/1; MG/1
1 TABLET ORAL EVERY 8 HOURS PRN
Qty: 15 TABLET | Refills: 0 | Status: SHIPPED | OUTPATIENT
Start: 2023-07-20 | End: 2023-07-25

## 2023-07-20 RX ORDER — METHYLPREDNISOLONE ACETATE 40 MG/ML
40 INJECTION, SUSPENSION INTRA-ARTICULAR; INTRALESIONAL; INTRAMUSCULAR; SOFT TISSUE ONCE
Status: COMPLETED | OUTPATIENT
Start: 2023-07-20 | End: 2023-07-20

## 2023-07-20 RX ORDER — CYCLOBENZAPRINE HCL 10 MG
10 TABLET ORAL 3 TIMES DAILY PRN
Qty: 30 TABLET | Refills: 0 | Status: SHIPPED | OUTPATIENT
Start: 2023-07-20 | End: 2023-07-30

## 2023-07-20 RX ORDER — OMEPRAZOLE 40 MG/1
40 CAPSULE, DELAYED RELEASE ORAL
Qty: 90 CAPSULE | Refills: 0 | Status: SHIPPED | OUTPATIENT
Start: 2023-07-20 | End: 2023-10-18

## 2023-07-20 RX ORDER — PREDNISONE 10 MG/1
TABLET ORAL
Qty: 45 TABLET | Refills: 0 | Status: SHIPPED | OUTPATIENT
Start: 2023-07-20

## 2023-07-20 RX ORDER — IBUPROFEN 200 MG
200 TABLET ORAL EVERY 6 HOURS PRN
COMMUNITY

## 2023-07-20 RX ADMIN — METHYLPREDNISOLONE ACETATE 40 MG: 40 INJECTION, SUSPENSION INTRA-ARTICULAR; INTRALESIONAL; INTRAMUSCULAR; SOFT TISSUE at 16:46

## 2023-07-20 NOTE — PATIENT INSTRUCTIONS
Depo Medrol 40 mg IM in office  Start oral Prednisone in the morning  Flexeril- muscle relaxer up to 3 times daily as needed- may cause drowsiness  Ibuprofen 800 mg every 8 hours as needed  Norco as needed for severe pain only  Apply ice to the painful area off-and-on for the next 48 hours and then switch to a heating pad  If pain worsens or there are additional symptoms- go directly to the ED.   Would like to see improvement within 72 hours

## 2023-07-20 NOTE — PROGRESS NOTES
2023    Syeda Haines    Chief Complaint   Patient presents with    Back Pain     Sciatic nerve pain left side of back going down left buttock, leg and groin. X 2 weeks. Does lift weights and went off roading. Has always dealt with lower back pain. On pain scale 10+. Has ended up on the ground because of the pain. Taking 8 200mg daily for pain and aleve. Heartburn     Would like heart burn medication back. Omeprazole. HPI  History was obtained from patient. Donna Ch is a 50 y.o. male who presents today with complaints of left-sided buttock pain x2 weeks. No known injury or change in activities. He does lift heavy weights regularly and was off-roading recently. He states that the pain is present when he is ambulating and sometimes reaches 10/10 in severity. When he is sitting, he has little to no pain at all. The pain radiates to the left lower extremity and there is associated vague numbness and tingling. He denies any bowel or bladder incontinence, saddle anesthesia, or lower extremity weakness. He denies testicular pain or swelling or penile discharge. He denies fever or chills. He has been taking up to 800 mg of ibuprofen at a time, typically twice daily since this pain developed. PAST MEDICAL HISTORY  Past Medical History:   Diagnosis Date    Abnormal EKG 2023 EKG is suspicious for inferior wall subendocardial injury or ischemia. Family history of premature CAD 2023    Mother  of heart attack at age of 46.     Hyperlipidemia        FAMILY HISTORY  Family History   Problem Relation Age of Onset    Diabetes Mother     Heart Attack Mother     Diabetes Father        SOCIAL HISTORY  Social History     Socioeconomic History    Marital status:      Spouse name: None    Number of children: None    Years of education: None    Highest education level: None   Occupational History    Occupation: electrian   Tobacco Use    Smoking status: Never

## 2023-08-07 ENCOUNTER — APPOINTMENT (OUTPATIENT)
Dept: CT IMAGING | Age: 53
End: 2023-08-07
Payer: COMMERCIAL

## 2023-08-07 ENCOUNTER — HOSPITAL ENCOUNTER (EMERGENCY)
Age: 53
Discharge: HOME | End: 2023-08-08
Payer: COMMERCIAL

## 2023-08-07 ENCOUNTER — TELEPHONE (OUTPATIENT)
Dept: FAMILY MEDICINE CLINIC | Age: 53
End: 2023-08-07

## 2023-08-07 DIAGNOSIS — M19.90 ACUTE ARTHRITIS: Primary | ICD-10-CM

## 2023-08-07 DIAGNOSIS — M48.061 SPINAL STENOSIS OF LUMBAR REGION, UNSPECIFIED WHETHER NEUROGENIC CLAUDICATION PRESENT: ICD-10-CM

## 2023-08-07 LAB
BILIRUBIN URINE: NEGATIVE MG/DL
BLOOD, URINE: NEGATIVE
CLARITY: CLEAR
COLOR: YELLOW
COMMENT UA: NORMAL
GLUCOSE, URINE: NEGATIVE MG/DL
KETONES, URINE: NEGATIVE MG/DL
LEUKOCYTE ESTERASE, URINE: NEGATIVE
NITRITE URINE, QUANTITATIVE: NEGATIVE
PH, URINE: 7 (ref 5–8)
PROTEIN UA: NEGATIVE MG/DL
SPECIFIC GRAVITY UA: 1.01 (ref 1–1.03)
UROBILINOGEN, URINE: 1 MG/DL (ref 0.2–1)

## 2023-08-07 PROCEDURE — 72131 CT LUMBAR SPINE W/O DYE: CPT

## 2023-08-07 PROCEDURE — 6360000002 HC RX W HCPCS: Performed by: PHYSICIAN ASSISTANT

## 2023-08-07 PROCEDURE — 96372 THER/PROPH/DIAG INJ SC/IM: CPT

## 2023-08-07 PROCEDURE — 81003 URINALYSIS AUTO W/O SCOPE: CPT

## 2023-08-07 PROCEDURE — 6370000000 HC RX 637 (ALT 250 FOR IP): Performed by: PHYSICIAN ASSISTANT

## 2023-08-07 PROCEDURE — 99284 EMERGENCY DEPT VISIT MOD MDM: CPT

## 2023-08-07 RX ORDER — MELOXICAM 7.5 MG/1
7.5 TABLET ORAL DAILY
Qty: 30 TABLET | Refills: 0 | Status: SHIPPED | OUTPATIENT
Start: 2023-08-07

## 2023-08-07 RX ORDER — PREDNISONE 20 MG/1
60 TABLET ORAL ONCE
Status: COMPLETED | OUTPATIENT
Start: 2023-08-07 | End: 2023-08-07

## 2023-08-07 RX ORDER — KETOROLAC TROMETHAMINE 15 MG/ML
30 INJECTION, SOLUTION INTRAMUSCULAR; INTRAVENOUS ONCE
Status: COMPLETED | OUTPATIENT
Start: 2023-08-07 | End: 2023-08-07

## 2023-08-07 RX ORDER — TRAMADOL HYDROCHLORIDE 50 MG/1
50 TABLET ORAL EVERY 6 HOURS PRN
Qty: 15 TABLET | Refills: 0 | Status: SHIPPED | OUTPATIENT
Start: 2023-08-07 | End: 2023-08-08 | Stop reason: SDUPTHER

## 2023-08-07 RX ADMIN — PREDNISONE 60 MG: 20 TABLET ORAL at 19:46

## 2023-08-07 RX ADMIN — KETOROLAC TROMETHAMINE 30 MG: 15 INJECTION, SOLUTION INTRAMUSCULAR; INTRAVENOUS at 19:45

## 2023-08-07 ASSESSMENT — PAIN SCALES - GENERAL: PAINLEVEL_OUTOF10: 5

## 2023-08-07 ASSESSMENT — PAIN DESCRIPTION - LOCATION: LOCATION: BACK

## 2023-08-07 ASSESSMENT — LIFESTYLE VARIABLES
HOW MANY STANDARD DRINKS CONTAINING ALCOHOL DO YOU HAVE ON A TYPICAL DAY: 1 OR 2
HOW OFTEN DO YOU HAVE A DRINK CONTAINING ALCOHOL: MONTHLY OR LESS

## 2023-08-07 ASSESSMENT — PAIN - FUNCTIONAL ASSESSMENT: PAIN_FUNCTIONAL_ASSESSMENT: NONE - DENIES PAIN

## 2023-08-07 NOTE — TELEPHONE ENCOUNTER
Patient was seen 7-20-23 by Sundar Lim for sciatica pain. The pain is not any better. patient requesting a X-ray or MRI. Send order to [facility] in [place]. Please notify patient once orders are generated.

## 2023-08-07 NOTE — ACP (ADVANCE CARE PLANNING)
Patient does not have any ACP documents/Medical Power of . LSW notes hospital will follow [place] Next of Kin hierarchy in the following descending order for priority:    Guardian  Spouse  Majority of adult Children  Parents  Majority of adult Siblings  Nearest Relative not described above    Per [place] Next of Kin hierarchy: Patients' parent will be [address]

## 2023-08-08 ENCOUNTER — APPOINTMENT (OUTPATIENT)
Dept: MRI IMAGING | Age: 53
End: 2023-08-08
Payer: COMMERCIAL

## 2023-08-08 ENCOUNTER — TELEPHONE (OUTPATIENT)
Dept: FAMILY MEDICINE CLINIC | Age: 53
End: 2023-08-08

## 2023-08-08 VITALS
SYSTOLIC BLOOD PRESSURE: 139 MMHG | RESPIRATION RATE: 17 BRPM | OXYGEN SATURATION: 96 % | BODY MASS INDEX: 27.3 KG/M2 | HEIGHT: 71 IN | DIASTOLIC BLOOD PRESSURE: 98 MMHG | WEIGHT: 195 LBS | TEMPERATURE: 98 F | HEART RATE: 91 BPM

## 2023-08-08 DIAGNOSIS — R39.9 LOWER URINARY TRACT SYMPTOMS: Primary | ICD-10-CM

## 2023-08-08 DIAGNOSIS — R33.9 URINARY RETENTION: Primary | ICD-10-CM

## 2023-08-08 PROCEDURE — 72146 MRI CHEST SPINE W/O DYE: CPT

## 2023-08-08 PROCEDURE — 72148 MRI LUMBAR SPINE W/O DYE: CPT

## 2023-08-08 RX ORDER — MELOXICAM 7.5 MG/1
7.5 TABLET ORAL DAILY
Qty: 30 TABLET | Refills: 3 | Status: SHIPPED | OUTPATIENT
Start: 2023-08-08 | End: 2023-08-14 | Stop reason: SDUPTHER

## 2023-08-08 RX ORDER — PREDNISONE 50 MG/1
50 TABLET ORAL DAILY
Qty: 5 TABLET | Refills: 0 | Status: SHIPPED | OUTPATIENT
Start: 2023-08-08 | End: 2023-08-13

## 2023-08-08 RX ORDER — TRAMADOL HYDROCHLORIDE 50 MG/1
50 TABLET ORAL EVERY 6 HOURS PRN
Qty: 15 TABLET | Refills: 0 | Status: SHIPPED | OUTPATIENT
Start: 2023-08-08 | End: 2023-08-11

## 2023-08-08 NOTE — TELEPHONE ENCOUNTER
Just to add on to Tez's message. Patient went to ER yesterday. Note incomplete by provider. There is a nurse note that stated post void residual was over 300 ml. Please let me know if you would like patient seen sooner. Has appointment with Vinicio Bennett next week.

## 2023-08-08 NOTE — TELEPHONE ENCOUNTER
To Cedric Emil-    Please add a PSA lab to his lab orders ----he is concerned about his prostate----he cannot void his bladder completely at all. Also, patient would like a referral to:    Dr. Юлия Mercado MD  [email or web address]  [facility] [place], [place], [address]  [phone]    He went to the [facility] ER and found out he has arthritis. Please advise.

## 2023-08-08 NOTE — TELEPHONE ENCOUNTER
I added the PSA order, but with those symptoms, he needs to see a urologist.  If he is agreeable, I will make a referral to [facility].

## 2023-08-08 NOTE — TELEPHONE ENCOUNTER
Erica Morrow, based on your last visit with this patient, would you say that a referral to neurosurgery is warranted? Or would you recommend he make an appointment to discuss this before a plan and or referral is made?

## 2023-08-09 DIAGNOSIS — M51.36 DEGENERATIVE DISC DISEASE, LUMBAR: Primary | ICD-10-CM

## 2023-08-09 DIAGNOSIS — M48.00 CENTRAL STENOSIS OF SPINAL CANAL: ICD-10-CM

## 2023-08-10 NOTE — TELEPHONE ENCOUNTER
Called and gave patient all info. He wanted to know if referral could be changed to Dr. Abilio Armstrong?

## 2023-08-10 NOTE — TELEPHONE ENCOUNTER
Spoke to patient. He is [facility] seeing Urology would like Dr. José Manuel Bird in [facility].

## 2023-08-11 ENCOUNTER — TELEPHONE (OUTPATIENT)
Dept: FAMILY MEDICINE CLINIC | Age: 53
End: 2023-08-11

## 2023-08-11 DIAGNOSIS — E29.1 HYPOGONADISM IN MALE: ICD-10-CM

## 2023-08-11 DIAGNOSIS — E78.5 HYPERLIPIDEMIA, UNSPECIFIED HYPERLIPIDEMIA TYPE: ICD-10-CM

## 2023-08-11 DIAGNOSIS — R39.9 LOWER URINARY TRACT SYMPTOMS: ICD-10-CM

## 2023-08-11 DIAGNOSIS — R79.89 LFT ELEVATION: ICD-10-CM

## 2023-08-11 DIAGNOSIS — R17 ELEVATED BILIRUBIN: ICD-10-CM

## 2023-08-11 DIAGNOSIS — M48.00 CENTRAL STENOSIS OF SPINAL CANAL: ICD-10-CM

## 2023-08-11 DIAGNOSIS — M51.36 DDD (DEGENERATIVE DISC DISEASE), LUMBAR: Primary | ICD-10-CM

## 2023-08-11 DIAGNOSIS — Z11.4 SCREENING FOR HUMAN IMMUNODEFICIENCY VIRUS: ICD-10-CM

## 2023-08-11 DIAGNOSIS — Z11.59 NEED FOR HEPATITIS C SCREENING TEST: ICD-10-CM

## 2023-08-11 LAB — HCV AB SERPL QL IA: NORMAL

## 2023-08-11 NOTE — TELEPHONE ENCOUNTER
days ago     Kobi Perea routed conversation to [employer] 3 days ago     Kobi Perea 3 days ago     KT  To [employer]-     Please add a PSA lab to his lab orders ----he is concerned about his prostate----he cannot void his bladder completely at all. Also, patient would like a referral to:     Dr. Celia Fairbanks MD  [email or web address]  [address], [address]  [phone]     He went to the [facility] ER and found out he has arthritis. Please advise.             Note

## 2023-08-12 LAB
ALBUMIN SERPL-MCNC: 4.4 G/DL (ref 3.4–5)
ALBUMIN/GLOB SERPL: 2 {RATIO} (ref 1.1–2.2)
ALP SERPL-CCNC: 58 U/L (ref 40–129)
ALT SERPL-CCNC: 79 U/L (ref 10–40)
ANION GAP SERPL CALCULATED.3IONS-SCNC: 10 MMOL/L (ref 3–16)
AST SERPL-CCNC: 31 U/L (ref 15–37)
BILIRUB SERPL-MCNC: 0.7 MG/DL (ref 0–1)
BUN SERPL-MCNC: 19 MG/DL (ref 7–20)
CALCIUM SERPL-MCNC: 9.5 MG/DL (ref 8.3–10.6)
CHLORIDE SERPL-SCNC: 98 MMOL/L (ref 99–110)
CO2 SERPL-SCNC: 28 MMOL/L (ref 21–32)
CREAT SERPL-MCNC: 0.9 MG/DL (ref 0.9–1.3)
GFR SERPLBLD CREATININE-BSD FMLA CKD-EPI: >60 ML/MIN/{1.73_M2}
GLUCOSE SERPL-MCNC: 101 MG/DL (ref 70–99)
HIV 1+2 AB+HIV1 P24 AG SERPL QL IA: NORMAL
HIV 2 AB SERPL QL IA: NORMAL
HIV1 AB SERPL QL IA: NORMAL
HIV1 P24 AG SERPL QL IA: NORMAL
POTASSIUM SERPL-SCNC: 4 MMOL/L (ref 3.5–5.1)
PROT SERPL-MCNC: 6.6 G/DL (ref 6.4–8.2)
PSA SERPL DL<=0.01 NG/ML-MCNC: 1.16 NG/ML (ref 0–4)
SODIUM SERPL-SCNC: 136 MMOL/L (ref 136–145)

## 2023-08-14 ENCOUNTER — OFFICE VISIT (OUTPATIENT)
Dept: FAMILY MEDICINE CLINIC | Age: 53
End: 2023-08-14
Payer: COMMERCIAL

## 2023-08-14 VITALS
HEIGHT: 71 IN | DIASTOLIC BLOOD PRESSURE: 90 MMHG | BODY MASS INDEX: 27.24 KG/M2 | SYSTOLIC BLOOD PRESSURE: 132 MMHG | WEIGHT: 194.6 LBS | OXYGEN SATURATION: 98 % | HEART RATE: 80 BPM

## 2023-08-14 DIAGNOSIS — R39.14 FEELING OF INCOMPLETE BLADDER EMPTYING: ICD-10-CM

## 2023-08-14 DIAGNOSIS — E78.5 HYPERLIPIDEMIA, UNSPECIFIED HYPERLIPIDEMIA TYPE: ICD-10-CM

## 2023-08-14 DIAGNOSIS — M54.50 SEVERE LOW BACK PAIN: Primary | ICD-10-CM

## 2023-08-14 DIAGNOSIS — R36.9 PENILE DISCHARGE: ICD-10-CM

## 2023-08-14 LAB
CHOLEST SERPL-MCNC: 149 MG/DL (ref 0–199)
HDLC SERPL-MCNC: 44 MG/DL (ref 40–60)
LDLC SERPL CALC-MCNC: 88 MG/DL
REAGIN+T PALLIDUM IGG+IGM SERPL-IMP: NORMAL
SPECIMEN TYPE: NORMAL
TRICHOMONAS VAGINALIS SCREEN: NEGATIVE
TRIGL SERPL-MCNC: 84 MG/DL (ref 0–150)
VLDLC SERPL CALC-MCNC: 17 MG/DL

## 2023-08-14 PROCEDURE — 3080F DIAST BP >= 90 MM HG: CPT | Performed by: PHYSICIAN ASSISTANT

## 2023-08-14 PROCEDURE — G8419 CALC BMI OUT NRM PARAM NOF/U: HCPCS | Performed by: PHYSICIAN ASSISTANT

## 2023-08-14 PROCEDURE — 99214 OFFICE O/P EST MOD 30 MIN: CPT | Performed by: PHYSICIAN ASSISTANT

## 2023-08-14 PROCEDURE — 3017F COLORECTAL CA SCREEN DOC REV: CPT | Performed by: PHYSICIAN ASSISTANT

## 2023-08-14 PROCEDURE — 1036F TOBACCO NON-USER: CPT | Performed by: PHYSICIAN ASSISTANT

## 2023-08-14 PROCEDURE — 3075F SYST BP GE 130 - 139MM HG: CPT | Performed by: PHYSICIAN ASSISTANT

## 2023-08-14 PROCEDURE — G8427 DOCREV CUR MEDS BY ELIG CLIN: HCPCS | Performed by: PHYSICIAN ASSISTANT

## 2023-08-14 RX ORDER — OXYCODONE HYDROCHLORIDE AND ACETAMINOPHEN 5; 325 MG/1; MG/1
1 TABLET ORAL EVERY 6 HOURS PRN
Qty: 20 TABLET | Refills: 0 | Status: SHIPPED | OUTPATIENT
Start: 2023-08-14 | End: 2023-08-19

## 2023-08-14 NOTE — PATIENT INSTRUCTIONS
Meloxicam 7.5-15 mg once daily (one or two tablets at a time), Take with food.   DO NOT TAKE WITH OTHER NSAIDS    Flexeril (muscle relaxer) 10 mg three times daily as needed    Percocet for severe pain only- no working or driving on this      Rule out UTI, gonorrhea, chlamydia, trichomonas, and syphilis  Follow-up with urology as planned  Follow-up with neurosurgery as planned  ED for any sudden worsening/changes

## 2023-08-14 NOTE — PROGRESS NOTES
8/14/2023    Princess Haines    Chief Complaint   Patient presents with    Follow-up     8-1-23 , [facility] back pain , DX  bone spurs , arthritis , bulging disc , pt states difficulty urinating , was given referral to urology     Discuss Labs     Pt reports recent bw and imaging       HPI  History was obtained from patient. Haider Franks is a 50 y.o. male who presents today for ER follow-up regarding back pain for 3-4 weeks without known injury. He states he has always had mild chronic pain but it has never been this severe. The pain is constant but does wax and wane. Sometimes he gets through his day just fine and other times it drops him to his knees. The pain is across the entire low back, worse on the left. It radiates down both lower extremities. There is no numbness, tingling, saddle anesthesia, bowel or bladder incontinence. He does have the feeling of incomplete bladder emptying since this pain worsened. Over the past few days, he is also had penile discharge which she describes as clear to creamy and looks like sperm. There is mild discomfort with urination. He is in a monogamous relationship and denies any concerns for sexually transmitted infections but is agreeable to testing today. I first saw him on 7/20/2023 and he was prescribed Depo 40 mg IM, Flexeril 10 mg 3 times daily, ibuprofen 800 mg 3 times daily, Norco 5/325 mg 3 times daily. When his pain did not improve on 8/7/23, he went to the emergency department. Postvoid bladder scan revealed residual of 300+ cc of urine in the bladder. Therefore, additional imaging obtained to assure no neurogenic urinary retention. He has had normal PSA labs. CT abdomen pelvis February 2023 showed unremarkable urinary bladder, prostate, and seminal vesicles. See recent CT and MRI results below. He was discharged home from the ED on meloxicam 7.5 mg daily and tramadol 50 mg #15.   He was also referred to neurosurgery but later called our office asking

## 2023-08-15 LAB
BACTERIA UR CULT: NORMAL
C TRACH DNA UR QL NAA+PROBE: NEGATIVE
N GONORRHOEA DNA UR QL NAA+PROBE: NEGATIVE

## 2023-08-15 NOTE — RESULT ENCOUNTER NOTE
Hello,    Thanks for getting your blood work done. Your cholesterol numbers have improved significantly! Your LDL went from 155 down to 88. Keep up the great work! One of your liver enzymes, ALT, is mildly elevated. You can go easy on your liver by avoiding alcohol, limiting use of acetaminophen (Tylenol) and eating a low-fat diet (limiting red meat, greasy foods, fried foods, heavy dairy). Let me know if you have any questions.      Bennie Mackenzie PA-C

## 2023-08-16 DIAGNOSIS — E78.5 HYPERLIPIDEMIA, UNSPECIFIED HYPERLIPIDEMIA TYPE: ICD-10-CM

## 2023-08-17 LAB
SHBG SERPL-SCNC: 6 NMOL/L (ref 11–80)
TESTOST FREE SERPL-MCNC: 34.4 PG/ML (ref 47–244)
TESTOST SERPL-MCNC: 99 NG/DL (ref 220–1000)

## 2023-08-18 RX ORDER — ATORVASTATIN CALCIUM 40 MG/1
40 TABLET, FILM COATED ORAL DAILY
Qty: 90 TABLET | Refills: 1 | Status: SHIPPED | OUTPATIENT
Start: 2023-08-18

## 2023-08-22 ENCOUNTER — PATIENT MESSAGE (OUTPATIENT)
Dept: FAMILY MEDICINE CLINIC | Age: 53
End: 2023-08-22

## 2023-08-22 DIAGNOSIS — M54.50 SEVERE LOW BACK PAIN: Primary | ICD-10-CM

## 2023-08-22 RX ORDER — HYDROCODONE BITARTRATE AND ACETAMINOPHEN 5; 325 MG/1; MG/1
1 TABLET ORAL EVERY 4 HOURS PRN
Qty: 20 TABLET | Refills: 0 | Status: SHIPPED | OUTPATIENT
Start: 2023-08-22 | End: 2023-08-27

## 2023-08-24 NOTE — RESULT ENCOUNTER NOTE
Fabio Acosta,    Thanks for getting your blood work done. It did confirm that your testosterone level is low. I can send a prescription for testosterone patches for you to start applying to bring your levels up. Patches are the best way to replace testosterone, because they deliver an even dose. If you are up trying the patches, please stop by the office to read and sign a controlled medication contract. Testosterone is a controlled medication, so we have to have this on file before prescribing it. To administer the patches:     Apply to skin immediately upon removal from the protective pouch. Apply at the same time each night to clean, dry area of skin on the back, abdomen, upper arms, or thigh. Do not apply to bony areas or parts of the body that are subject to prolonged pressure while sleeping or sitting. Do not apply to oily, damaged, or irritated skin. Do not apply to the scrotum. Rotate administration sites, allowing 7 days between applying to the same site. Avoid showering, washing the site, or swimming for more than 3 hours after application. Following patch removal, mild skin irritation may be treated with OTC hydrocortisone cream. Dispose of any used or unused patches by folding adhesive ends together, replace in pouch or sealed container, and discard properly in trash [address]. Let me know if you have any questions.   Toshia Nogueira

## 2023-08-25 DIAGNOSIS — K21.9 GASTROESOPHAGEAL REFLUX DISEASE, UNSPECIFIED WHETHER ESOPHAGITIS PRESENT: ICD-10-CM

## 2023-08-25 RX ORDER — OMEPRAZOLE 40 MG/1
40 CAPSULE, DELAYED RELEASE ORAL
Qty: 90 CAPSULE | Refills: 0 | OUTPATIENT
Start: 2023-08-25 | End: 2023-11-23

## 2023-08-28 ENCOUNTER — OFFICE VISIT (OUTPATIENT)
Dept: FAMILY MEDICINE CLINIC | Age: 53
End: 2023-08-28
Payer: COMMERCIAL

## 2023-08-28 VITALS
OXYGEN SATURATION: 97 % | HEIGHT: 71 IN | HEART RATE: 101 BPM | BODY MASS INDEX: 26.75 KG/M2 | WEIGHT: 191.1 LBS | SYSTOLIC BLOOD PRESSURE: 124 MMHG | DIASTOLIC BLOOD PRESSURE: 88 MMHG

## 2023-08-28 DIAGNOSIS — M54.50 SEVERE LOW BACK PAIN: Primary | ICD-10-CM

## 2023-08-28 DIAGNOSIS — E29.1 HYPOGONADISM IN MALE: Primary | ICD-10-CM

## 2023-08-28 PROCEDURE — 3017F COLORECTAL CA SCREEN DOC REV: CPT | Performed by: PHYSICIAN ASSISTANT

## 2023-08-28 PROCEDURE — 99214 OFFICE O/P EST MOD 30 MIN: CPT | Performed by: PHYSICIAN ASSISTANT

## 2023-08-28 PROCEDURE — 96372 THER/PROPH/DIAG INJ SC/IM: CPT | Performed by: PHYSICIAN ASSISTANT

## 2023-08-28 PROCEDURE — 3079F DIAST BP 80-89 MM HG: CPT | Performed by: PHYSICIAN ASSISTANT

## 2023-08-28 PROCEDURE — 3074F SYST BP LT 130 MM HG: CPT | Performed by: PHYSICIAN ASSISTANT

## 2023-08-28 PROCEDURE — G8427 DOCREV CUR MEDS BY ELIG CLIN: HCPCS | Performed by: PHYSICIAN ASSISTANT

## 2023-08-28 PROCEDURE — G8419 CALC BMI OUT NRM PARAM NOF/U: HCPCS | Performed by: PHYSICIAN ASSISTANT

## 2023-08-28 PROCEDURE — 1036F TOBACCO NON-USER: CPT | Performed by: PHYSICIAN ASSISTANT

## 2023-08-28 RX ORDER — IBUPROFEN 800 MG/1
800 TABLET ORAL EVERY 6 HOURS PRN
Qty: 42 TABLET | Refills: 0 | Status: SHIPPED | OUTPATIENT
Start: 2023-08-28 | End: 2023-09-11

## 2023-08-28 RX ORDER — TESTOSTERONE 4 MG/D
1 PATCH TRANSDERMAL EVERY 24 HOURS
Qty: 30 PATCH | Refills: 0 | Status: SHIPPED | OUTPATIENT
Start: 2023-08-28 | End: 2023-09-27

## 2023-08-28 RX ORDER — OXYBUTYNIN CHLORIDE 10 MG/1
10 TABLET, EXTENDED RELEASE ORAL DAILY
COMMUNITY
Start: 2023-08-16

## 2023-08-28 RX ORDER — OXYCODONE HYDROCHLORIDE AND ACETAMINOPHEN 5; 325 MG/1; MG/1
1 TABLET ORAL 3 TIMES DAILY
COMMUNITY
Start: 2023-08-14

## 2023-08-28 RX ORDER — TAMSULOSIN HYDROCHLORIDE 0.4 MG/1
0.4 CAPSULE ORAL DAILY
COMMUNITY
Start: 2023-08-16

## 2023-08-28 RX ORDER — KETOROLAC TROMETHAMINE 30 MG/ML
60 INJECTION, SOLUTION INTRAMUSCULAR; INTRAVENOUS ONCE
Status: COMPLETED | OUTPATIENT
Start: 2023-08-28 | End: 2023-08-28

## 2023-08-28 RX ORDER — GABAPENTIN 300 MG/1
300 CAPSULE ORAL 3 TIMES DAILY
Qty: 90 CAPSULE | Refills: 0 | Status: SHIPPED | OUTPATIENT
Start: 2023-08-28 | End: 2023-09-27

## 2023-08-28 RX ADMIN — KETOROLAC TROMETHAMINE 60 MG: 30 INJECTION, SOLUTION INTRAMUSCULAR; INTRAVENOUS at 14:39

## 2023-08-28 NOTE — PROGRESS NOTES
8/28/2023    Claudette Fredrickson Wilcoxon    Chief Complaint   Patient presents with    Lower Back Pain     X's 1 week , sciatica , pt reports pain in left side lower back into hip rad down leg , pt states pain meds not helping , pt reports started PT last week        HPI  History obtained from the patient. Chelsea Bowen is a 48 y.o. male who presents today for back pain X 2 months. Back Pain - Patient has been seen multiple times for same day visits and an ED visit on 8/7/23 for severe back pain. This started in early July. The patient cannot recall any specific injuries or falls, though he does note that he rode 4 wheelers at the beginning of July and was also doing heavy lifting at the gym and wonders if he overdid it. The patient has tried multiple steroid tapers, multiple pain medications including Norco, tramadol, and Percocet. He states that nothing he has tried has given him relief. Patient complains of pain in his low back, which radiates down his left leg. He describes this as \"shooting\" pain. The patient states that his pain is so severe, that it is hard to be on his feet standing for more than a few minutes. He states that he has been taking baths because he cannot tolerate standing in the shower for very long. He went to the chiropractor last week for an adjustment, but this did not make a big difference. The pain has been so severe it makes him nauseous. Patient is a  and has to be up and down a lot at work, which he is currently finding impossible. The patient had an MRI done on 8/7/2023 (see results below). He was referred to neurosurgery and saw Dr. Estella Kohli. Dr. Estella Kohli recommended physical therapy. He has a follow up appointment with him tomorrow and had his first PT appointment last week. MRI Lumbar and Thoracic Spine 8/7/23  Minimal disc bulges at T8-9 and T9-10. No significant neural foraminal  narrowing or spinal canal stenosis.   1. Multilevel degenerative disease and facet

## 2023-08-28 NOTE — PATIENT INSTRUCTIONS
Norco and Percocet both contain Acetaminophen (Tylenol), so you won't want to take any additional Tylenol on top of these medications. You can, however, take NSAIDs on top of Percocet or Norco. NSAIDs are meloxicam (Mobic) 7.5 mg OR ibuprofen (Advil) 800 mg. (Any NSAID should be taken with food).

## 2023-09-06 ENCOUNTER — TELEPHONE (OUTPATIENT)
Dept: FAMILY MEDICINE CLINIC | Age: 53
End: 2023-09-06

## 2023-09-06 NOTE — TELEPHONE ENCOUNTER
----- Message from Roman Joe sent at 9/6/2023  8:38 AM EDT -----  Subject: Message to Provider    QUESTIONS  Information for Provider? Results from MRI and CT sent to [facility],   Neuro, upcoming appt 9/15.  ---------------------------------------------------------------------------  --------------  Geraldine LAURA  [phone]; OK to leave message on voicemail  ---------------------------------------------------------------------------  --------------  SCRIPT ANSWERS  Relationship to Patient?  Self

## 2023-09-14 ENCOUNTER — OFFICE VISIT (OUTPATIENT)
Dept: FAMILY MEDICINE CLINIC | Age: 53
End: 2023-09-14
Payer: COMMERCIAL

## 2023-09-14 VITALS
WEIGHT: 193.6 LBS | HEART RATE: 86 BPM | HEIGHT: 71 IN | OXYGEN SATURATION: 97 % | DIASTOLIC BLOOD PRESSURE: 88 MMHG | BODY MASS INDEX: 27.1 KG/M2 | SYSTOLIC BLOOD PRESSURE: 126 MMHG

## 2023-09-14 DIAGNOSIS — K21.9 GASTROESOPHAGEAL REFLUX DISEASE, UNSPECIFIED WHETHER ESOPHAGITIS PRESENT: ICD-10-CM

## 2023-09-14 DIAGNOSIS — E29.1 HYPOGONADISM IN MALE: ICD-10-CM

## 2023-09-14 DIAGNOSIS — M54.50 SEVERE LOW BACK PAIN: Primary | ICD-10-CM

## 2023-09-14 PROCEDURE — 3074F SYST BP LT 130 MM HG: CPT | Performed by: PHYSICIAN ASSISTANT

## 2023-09-14 PROCEDURE — G8427 DOCREV CUR MEDS BY ELIG CLIN: HCPCS | Performed by: PHYSICIAN ASSISTANT

## 2023-09-14 PROCEDURE — 3017F COLORECTAL CA SCREEN DOC REV: CPT | Performed by: PHYSICIAN ASSISTANT

## 2023-09-14 PROCEDURE — 99214 OFFICE O/P EST MOD 30 MIN: CPT | Performed by: PHYSICIAN ASSISTANT

## 2023-09-14 PROCEDURE — 3079F DIAST BP 80-89 MM HG: CPT | Performed by: PHYSICIAN ASSISTANT

## 2023-09-14 PROCEDURE — G8419 CALC BMI OUT NRM PARAM NOF/U: HCPCS | Performed by: PHYSICIAN ASSISTANT

## 2023-09-14 PROCEDURE — 1036F TOBACCO NON-USER: CPT | Performed by: PHYSICIAN ASSISTANT

## 2023-09-14 RX ORDER — TESTOSTERONE 16.2 MG/G
2 GEL TRANSDERMAL DAILY
Qty: 75 G | Refills: 0 | Status: SHIPPED | OUTPATIENT
Start: 2023-09-14 | End: 2023-10-14

## 2023-09-14 RX ORDER — IBUPROFEN 800 MG/1
800 TABLET ORAL EVERY 6 HOURS PRN
Qty: 42 TABLET | Refills: 0 | Status: SHIPPED | OUTPATIENT
Start: 2023-09-14 | End: 2023-09-28

## 2023-09-14 RX ORDER — PREGABALIN 75 MG/1
75 CAPSULE ORAL 2 TIMES DAILY
COMMUNITY
Start: 2023-08-29

## 2023-09-14 RX ORDER — OMEPRAZOLE 40 MG/1
40 CAPSULE, DELAYED RELEASE ORAL
Qty: 90 CAPSULE | Refills: 0 | Status: SHIPPED | OUTPATIENT
Start: 2023-09-14 | End: 2023-12-13

## 2023-09-14 NOTE — PATIENT INSTRUCTIONS
You may take Gabapentin up to three times per day. Acetaminophen (Tylenol) can be taken on top of ibuprofen. Tylenol can be taken up to 1000 mg at a time every 6 hours.

## 2023-09-14 NOTE — PROGRESS NOTES
2023    Mathieu Haines    Chief Complaint   Patient presents with    Follow-up     2 week f/u for back pain     Discuss Medications     Pt can not afford testosterone patches and is wanting to discuss alternatives such as injectable form        HPI  History obtained from the patient. Johanne Park is a 48 y.o. male who presents today for 2 week follow up on back pain. Patient has been doing physical therapy but has unfortunately seen no progress. He is still in severe pain. He saw Dr. Dhaval Cabrera again and scheduled an appointment with Dr. Jg Meneses for tomorrow when he is in [place]. Patient does admit that he has only been taking gabapentin once daily. He did not realize he can take it 3 times per day. The patient reports that testosterone patches are not affordable for him. He went to the pharmacy to pick these up and was told that it caused 100s of dollars. REVIEW OF SYMPTOMS  Review of Systems   Constitutional: Negative for chills and fever. Respiratory: Negative for cough and shortness of breath. Gastrointestinal: Negative for diarrhea and vomiting. PAST MEDICAL HISTORY  Past Medical History:   Diagnosis Date    Abnormal EKG 2023 EKG is suspicious for inferior wall subendocardial injury or ischemia. Family history of premature CAD 2023    Mother  of heart attack at age of 46.     Hyperlipidemia        FAMILY HISTORY  Family History   Problem Relation Age of Onset    Diabetes Mother     Heart Attack Mother     Diabetes Father        SOCIAL HISTORY  Social History     Socioeconomic History    Marital status:    Occupational History    Occupation: electrian   Tobacco Use    Smoking status: Never    Smokeless tobacco: Never   Substance and Sexual Activity    Alcohol use: Never    Drug use: Never     Social Determinants of Health     Financial Resource Strain: Low Risk  (2023)    Overall Financial Resource Strain (CARDIA)     Difficulty of Paying Living

## 2023-09-26 ENCOUNTER — TELEPHONE (OUTPATIENT)
Dept: FAMILY MEDICINE CLINIC | Age: 53
End: 2023-09-26

## 2023-09-26 NOTE — TELEPHONE ENCOUNTER
Called pt to inform of short term disability forms completed and faxed . Forms will be left at front for pt to  9-27-23 .   Understanding voiced

## 2023-10-12 ENCOUNTER — TELEPHONE (OUTPATIENT)
Dept: FAMILY MEDICINE CLINIC | Age: 53
End: 2023-10-12

## 2023-10-30 ENCOUNTER — OFFICE VISIT (OUTPATIENT)
Dept: FAMILY MEDICINE CLINIC | Age: 53
End: 2023-10-30
Payer: COMMERCIAL

## 2023-10-30 VITALS
BODY MASS INDEX: 28.07 KG/M2 | OXYGEN SATURATION: 98 % | WEIGHT: 200.5 LBS | HEART RATE: 70 BPM | DIASTOLIC BLOOD PRESSURE: 80 MMHG | HEIGHT: 71 IN | SYSTOLIC BLOOD PRESSURE: 122 MMHG

## 2023-10-30 DIAGNOSIS — E29.1 HYPOGONADISM IN MALE: Primary | ICD-10-CM

## 2023-10-30 DIAGNOSIS — Z12.11 COLON CANCER SCREENING: ICD-10-CM

## 2023-10-30 DIAGNOSIS — E29.1 HYPOGONADISM IN MALE: ICD-10-CM

## 2023-10-30 DIAGNOSIS — E78.00 PURE HYPERCHOLESTEROLEMIA: ICD-10-CM

## 2023-10-30 DIAGNOSIS — I10 PRIMARY HYPERTENSION: ICD-10-CM

## 2023-10-30 DIAGNOSIS — M54.50 SEVERE LOW BACK PAIN: ICD-10-CM

## 2023-10-30 PROCEDURE — 99213 OFFICE O/P EST LOW 20 MIN: CPT | Performed by: PHYSICIAN ASSISTANT

## 2023-10-30 PROCEDURE — 3074F SYST BP LT 130 MM HG: CPT | Performed by: PHYSICIAN ASSISTANT

## 2023-10-30 PROCEDURE — 3017F COLORECTAL CA SCREEN DOC REV: CPT | Performed by: PHYSICIAN ASSISTANT

## 2023-10-30 PROCEDURE — G8427 DOCREV CUR MEDS BY ELIG CLIN: HCPCS | Performed by: PHYSICIAN ASSISTANT

## 2023-10-30 PROCEDURE — G8419 CALC BMI OUT NRM PARAM NOF/U: HCPCS | Performed by: PHYSICIAN ASSISTANT

## 2023-10-30 PROCEDURE — 3079F DIAST BP 80-89 MM HG: CPT | Performed by: PHYSICIAN ASSISTANT

## 2023-10-30 PROCEDURE — 1036F TOBACCO NON-USER: CPT | Performed by: PHYSICIAN ASSISTANT

## 2023-10-30 PROCEDURE — G8484 FLU IMMUNIZE NO ADMIN: HCPCS | Performed by: PHYSICIAN ASSISTANT

## 2023-10-30 NOTE — PATIENT INSTRUCTIONS
Cologuard  Please let me know if you have not received your kit in the mail within 2 weeks. Once the kit arrives, take it directly to the bathroom and place it on the back of the toilet. Go ahead and open the box and read the instructions. That way you'll be prepared to do the sample the next time you need to make a bowel movement. If you have any questions, feel free to call our office or call [facility]. Please try to get it done within 2 weeks of the kit's arrival. Once you've provided the sample, you can submit your kit by dropping it off at [address] or calling [phone] to schedule a . Instructions  Place the toilet bracket UNDERNEATH the toilet seat. Place your sample container into the toilet bracket. Provide your stool sample into the sample container. Use the probe to scrape your sample. Place the probe into the tube and screw it shut. Fill the sample container with the preservative (pour the ENTIRE bottle over the sample) and screw on the sample container lid. Fill out both labels with your name and information. Place a label on the probe tube and on the sample container itself. Call [phone] to schedule a UPS pickup with the prepaid label OR drop your kit off at [address]. IMPORTANT: Ship your kit back to us the SAME or NEXT day after collection.

## 2023-10-30 NOTE — PROGRESS NOTES
10/30/2023    Debra Haines    Chief Complaint   Patient presents with    Follow-up     6 month        HPI  History obtained from the patient. Darien Hazel is a 48 y.o. male who presents today for 6 month follow up. Back Pain - Patient has gotten significant relief of his back pain. He's been following with Chiropractor, Bonifacio Braswell, in [facility]. Hypogonadism - Patient has been using the gel and feels it's been effective. Hypertension - Patient is doing well on on lisinopril-hydrochlorothiazide 10-12.5 mg daily    HLD - Compliant with Atorvastatin 40 mg nightly     Specialists:  Urology ([facility]) - Forbes Hamman  Chiropractor (Back Pain with Left Sciatica)  - Bonifacio Braswell in [address] [facility] Maintenance - Patient's care gaps include COLON CA SCREEN, Shingles vaccine. Patient is agreeable to [facility]. Next COLON CANCER SCREENING will be due: OVERDUE    The 10-year ASCVD risk score (Christy RG, et al., 2019) is: 3.1%    Values used to calculate the score:      Age: 48 years      Sex: Male      Is Non- : No      Diabetic: No      Tobacco smoker: No      Systolic Blood Pressure: 716 mmHg      Is BP treated: Yes      HDL Cholesterol: 44 mg/dL      Total Cholesterol: 149 mg/dL    Last Weight Metrics:      10/30/2023     2:31 PM 9/14/2023    11:46 AM 8/28/2023     1:23 PM 8/14/2023     9:18 AM 8/7/2023     3:49 PM 7/20/2023     4:15 PM 4/27/2023     2:23 PM   Weight Loss Metrics   Height 5' 10.984\" 5' 11\" 5' 11\" 5' 11\" 5' 11\" 5' 11\" 5' 11\"   Weight - Scale 200 lbs 8 oz 193 lbs 10 oz 191 lbs 2 oz 194 lbs 10 oz 195 lbs 202 lbs 6 oz 202 lbs   BMI (Calculated) 28 kg/m2 27.1 kg/m2 26.7 kg/m2 27.2 kg/m2 27.3 kg/m2 28.3 kg/m2 28.2 kg/m2       REVIEW OF SYMPTOMS  Review of Systems   Constitutional: Negative for chills and fever. Respiratory: Negative for cough and shortness of breath. Gastrointestinal: Negative for diarrhea and vomiting.      PAST MEDICAL HISTORY  Past

## 2023-11-01 LAB
SHBG SERPL-SCNC: 11 NMOL/L (ref 11–80)
TESTOST FREE SERPL-MCNC: 221.6 PG/ML (ref 47–244)
TESTOST SERPL-MCNC: 657 NG/DL (ref 220–1000)

## 2023-11-06 NOTE — RESULT ENCOUNTER NOTE
Fabio FINCH,    Thanks for getting your blood work done. Good news! Your testosterone levels have improved significantly and are back in the normal ranges. Let me know if you have any questions.   Toshia Sanches

## 2023-11-14 DIAGNOSIS — E29.1 HYPOGONADISM IN MALE: ICD-10-CM

## 2023-11-14 DIAGNOSIS — M54.50 SEVERE LOW BACK PAIN: ICD-10-CM

## 2023-11-14 RX ORDER — IBUPROFEN 800 MG/1
800 TABLET ORAL EVERY 6 HOURS PRN
Qty: 42 TABLET | Refills: 0 | Status: SHIPPED | OUTPATIENT
Start: 2023-11-14 | End: 2023-11-28

## 2023-11-16 RX ORDER — TESTOSTERONE 16.2 MG/G
2 GEL TRANSDERMAL DAILY
Qty: 75 G | Refills: 0 | Status: SHIPPED | OUTPATIENT
Start: 2023-11-16 | End: 2023-12-16

## 2023-12-01 LAB — NONINV COLON CA DNA+OCC BLD SCRN STL QL: NEGATIVE

## 2023-12-08 DIAGNOSIS — E29.1 HYPOGONADISM IN MALE: ICD-10-CM

## 2023-12-11 RX ORDER — TESTOSTERONE 16.2 MG/G
2 GEL TRANSDERMAL DAILY
Qty: 75 G | Refills: 0 | Status: SHIPPED | OUTPATIENT
Start: 2023-12-13 | End: 2024-01-12

## 2024-01-03 DIAGNOSIS — K21.9 GASTROESOPHAGEAL REFLUX DISEASE, UNSPECIFIED WHETHER ESOPHAGITIS PRESENT: ICD-10-CM

## 2024-01-04 RX ORDER — OMEPRAZOLE 40 MG/1
40 CAPSULE, DELAYED RELEASE ORAL
Qty: 90 CAPSULE | Refills: 0 | Status: SHIPPED | OUTPATIENT
Start: 2024-01-04 | End: 2024-04-03

## 2024-01-10 DIAGNOSIS — E29.1 HYPOGONADISM IN MALE: ICD-10-CM

## 2024-01-11 RX ORDER — TESTOSTERONE 16.2 MG/G
2 GEL TRANSDERMAL DAILY
Qty: 75 G | Refills: 0 | Status: SHIPPED | OUTPATIENT
Start: 2024-01-11 | End: 2024-02-10

## 2024-02-07 DIAGNOSIS — E29.1 HYPOGONADISM IN MALE: ICD-10-CM

## 2024-02-08 DIAGNOSIS — E29.1 HYPOGONADISM IN MALE: ICD-10-CM

## 2024-02-08 RX ORDER — TESTOSTERONE 20.25 MG/1.25G
GEL TOPICAL
Qty: 75 G | Refills: 0 | Status: SHIPPED | OUTPATIENT
Start: 2024-02-10

## 2024-02-08 RX ORDER — TESTOSTERONE 20.25 MG/1.25G
GEL TOPICAL
Qty: 75 G | Refills: 0 | OUTPATIENT
Start: 2024-02-08

## 2024-03-10 DIAGNOSIS — E29.1 HYPOGONADISM IN MALE: ICD-10-CM

## 2024-03-12 DIAGNOSIS — E29.1 HYPOGONADISM IN MALE: ICD-10-CM

## 2024-03-12 RX ORDER — TESTOSTERONE 20.25 MG/1.25G
GEL TOPICAL
Qty: 75 G | Refills: 0 | OUTPATIENT
Start: 2024-03-12

## 2024-04-16 ENCOUNTER — OFFICE VISIT (OUTPATIENT)
Dept: FAMILY MEDICINE CLINIC | Age: 54
End: 2024-04-16
Payer: COMMERCIAL

## 2024-04-16 VITALS
HEIGHT: 71 IN | WEIGHT: 201 LBS | HEART RATE: 88 BPM | BODY MASS INDEX: 28.14 KG/M2 | DIASTOLIC BLOOD PRESSURE: 74 MMHG | OXYGEN SATURATION: 96 % | SYSTOLIC BLOOD PRESSURE: 118 MMHG

## 2024-04-16 DIAGNOSIS — E29.1 HYPOGONADISM IN MALE: ICD-10-CM

## 2024-04-16 DIAGNOSIS — M54.50 SEVERE LOW BACK PAIN: ICD-10-CM

## 2024-04-16 DIAGNOSIS — K21.9 GASTROESOPHAGEAL REFLUX DISEASE, UNSPECIFIED WHETHER ESOPHAGITIS PRESENT: ICD-10-CM

## 2024-04-16 DIAGNOSIS — E78.5 HYPERLIPIDEMIA, UNSPECIFIED HYPERLIPIDEMIA TYPE: ICD-10-CM

## 2024-04-16 DIAGNOSIS — I10 ESSENTIAL HYPERTENSION: ICD-10-CM

## 2024-04-16 PROCEDURE — 3074F SYST BP LT 130 MM HG: CPT | Performed by: PHYSICIAN ASSISTANT

## 2024-04-16 PROCEDURE — 3078F DIAST BP <80 MM HG: CPT | Performed by: PHYSICIAN ASSISTANT

## 2024-04-16 PROCEDURE — G8419 CALC BMI OUT NRM PARAM NOF/U: HCPCS | Performed by: PHYSICIAN ASSISTANT

## 2024-04-16 PROCEDURE — 1036F TOBACCO NON-USER: CPT | Performed by: PHYSICIAN ASSISTANT

## 2024-04-16 PROCEDURE — 99214 OFFICE O/P EST MOD 30 MIN: CPT | Performed by: PHYSICIAN ASSISTANT

## 2024-04-16 PROCEDURE — 3017F COLORECTAL CA SCREEN DOC REV: CPT | Performed by: PHYSICIAN ASSISTANT

## 2024-04-16 PROCEDURE — G8427 DOCREV CUR MEDS BY ELIG CLIN: HCPCS | Performed by: PHYSICIAN ASSISTANT

## 2024-04-16 RX ORDER — TESTOSTERONE 20.25 MG/1.25G
GEL TOPICAL
Qty: 75 G | Refills: 0 | Status: SHIPPED | OUTPATIENT
Start: 2024-04-16

## 2024-04-16 RX ORDER — METHYLPREDNISOLONE 4 MG/1
TABLET ORAL
Qty: 1 KIT | Refills: 0 | Status: SHIPPED | OUTPATIENT
Start: 2024-04-16

## 2024-04-16 RX ORDER — ATORVASTATIN CALCIUM 40 MG/1
40 TABLET, FILM COATED ORAL DAILY
Qty: 90 TABLET | Refills: 1 | Status: SHIPPED | OUTPATIENT
Start: 2024-04-16

## 2024-04-16 RX ORDER — TIZANIDINE 2 MG/1
2-4 TABLET ORAL 3 TIMES DAILY PRN
Qty: 30 TABLET | Refills: 0 | Status: SHIPPED | OUTPATIENT
Start: 2024-04-16 | End: 2024-04-23

## 2024-04-16 RX ORDER — IBUPROFEN 800 MG/1
800 TABLET ORAL EVERY 6 HOURS PRN
Qty: 42 TABLET | Refills: 0 | Status: SHIPPED | OUTPATIENT
Start: 2024-04-16 | End: 2024-04-30

## 2024-04-16 RX ORDER — LISINOPRIL AND HYDROCHLOROTHIAZIDE 12.5; 1 MG/1; MG/1
1 TABLET ORAL DAILY
Qty: 90 TABLET | Refills: 1 | Status: SHIPPED | OUTPATIENT
Start: 2024-04-16

## 2024-04-16 RX ORDER — OMEPRAZOLE 40 MG/1
40 CAPSULE, DELAYED RELEASE ORAL
Qty: 90 CAPSULE | Refills: 0 | Status: SHIPPED | OUTPATIENT
Start: 2024-04-16 | End: 2024-07-15

## 2024-04-16 ASSESSMENT — PATIENT HEALTH QUESTIONNAIRE - PHQ9
2. FEELING DOWN, DEPRESSED OR HOPELESS: NOT AT ALL
SUM OF ALL RESPONSES TO PHQ9 QUESTIONS 1 & 2: 0
SUM OF ALL RESPONSES TO PHQ QUESTIONS 1-9: 0
1. LITTLE INTEREST OR PLEASURE IN DOING THINGS: NOT AT ALL

## 2024-04-16 NOTE — PATIENT INSTRUCTIONS
VACCINES   Shingles vaccine    Next tetanus booster (Tdap) will be due: 2033 (Tetanus boosters are every 10 years).     CANCER SCREENINGS  Next COLON CANCER SCREENING will be due: 2026 (cologuard)    BLOOD WORK  Please come back for FASTING blood work sometime within the next TWO WEEKS.      Be sure to fast for at least 12 HOURS before having your blood work drawn. Drink plenty of WATER before coming in the DAY BEFORE and the MORNING OF coming in to get your blood drawn. This will make it easier to draw your blood. You may also have black coffee or black tea if you wish (BUT NO CREAM OR SUGAR) .     [facility]   [address], [address], [place] [address]  Monday through Friday 8:00am - 12:30pm and 1:00pm - 4:00pm     [facility]  [address] [place] [address]  Monday through Friday 7:00am - 5:00pm   Saturday 8:00am - 12:00pm     RESULTS  Keep an eye on MyChart for a result note from me within a WEEK of getting your blood work. If you do not have MyChart, we will call you with results.          SHINGLES VACCINE -- Make sure to get your Shingles Vaccine at a Local Pharmacy    I highly recommend the SHINGRIX VACCINE. Unfortunately we do not currently carry this vaccine at our office, but you can get it at a local pharmacy like [facility] or [facility] or at the Health Department. No prescription needed. As of 2023, almost all insurance companies started covering the Shingles vaccine, so for Medicare Part D and 95% of private insurance companies, the Shingrix vaccine is FREE -- $0 for patients.     There are two types of shingles vaccines. The old shingles vaccine was Zostavax. Around 2017, a new, more effective shingles vaccine called Shingrix came out. Shingrix provides greater protection against Shingles and provides longer lasting immunity. You should go ahead and get the new shingles vaccine, Shingrix, even if you've already had Zostavax.     Shingrix is a two dose

## 2024-04-16 NOTE — PROGRESS NOTES
4/16/2024    Dave Haines    Chief Complaint   Patient presents with    Follow-up    Medication Refill    Back Pain     Flaring up again    Joint Pain     Elbows/knees x1 month.        HPI  History obtained from the patient.    Dave is a 53 y.o. male who presents today for 6 month follow up.    Hypogonadism-  Patient is doing well on testosterone gel but hasn't had it in about one month because he ran out and hasn't been in for his 3 month follow up appointment.     Hypertension - Patient is well controlled on lisinopril-hydrochlorothiazide 10-12.5 mg daily. Does not check bp at home.     HLD - Compliant with Atorvastatin 40 mg nightly.    Patient does complain of pain in his back, knees, and elbows.  He is an avid weightlifter and wonders if this is what is causing the joint pain.  Patient has been taking ibuprofen without much relief.  No injuries.    Specialists:  Urology ([facility]) - David Orellana  Chiropractor (Back Pain with Left Sciatica)  - Darell Landaverde in [facility] Maintenance    VACCINES   Shingles vaccine    Next tetanus booster (Tdap) will be due: 2033 (Tetanus boosters are every 10 years).     CANCER SCREENINGS  Next COLON CANCER SCREENING will be due: 2026 (cologuard)    PHQ-9 Total Score: 0 (4/16/2024  1:59 PM)    The 10-year ASCVD risk score (Christy RG, et al., 2019) is: 3%    Values used to calculate the score:      Age: 53 years      Sex: Male      Is Non- : No      Diabetic: No      Tobacco smoker: No      Systolic Blood Pressure: 118 mmHg      Is BP treated: Yes      HDL Cholesterol: 44 mg/dL      Total Cholesterol: 149 mg/dL    Last Weight Metrics:      4/16/2024     1:56 PM 10/30/2023     2:31 PM 9/14/2023    11:46 AM 8/28/2023     1:23 PM 8/14/2023     9:18 AM 8/7/2023     3:49 PM 7/20/2023     4:15 PM   Weight Loss Metrics   Height 5' 11\" 5' 10.984\" 5' 11\" 5' 11\" 5' 11\" 5' 11\" 5' 11\"   Weight - Scale 201 lbs 200 lbs 8 oz 193 lbs 10 oz 191 lbs 2 oz

## 2024-05-20 DIAGNOSIS — E29.1 HYPOGONADISM IN MALE: ICD-10-CM

## 2024-05-20 RX ORDER — TESTOSTERONE 20.25 MG/1.25G
GEL TOPICAL
Qty: 75 G | Refills: 0 | Status: SHIPPED | OUTPATIENT
Start: 2024-05-20

## 2024-05-20 RX ORDER — TESTOSTERONE 20.25 MG/1.25G
GEL TOPICAL
Qty: 75 G | Refills: 0 | OUTPATIENT
Start: 2024-05-20

## 2024-06-17 DIAGNOSIS — M54.50 SEVERE LOW BACK PAIN: ICD-10-CM

## 2024-06-17 DIAGNOSIS — K21.9 GASTROESOPHAGEAL REFLUX DISEASE, UNSPECIFIED WHETHER ESOPHAGITIS PRESENT: ICD-10-CM

## 2024-06-17 RX ORDER — IBUPROFEN 800 MG/1
800 TABLET ORAL EVERY 8 HOURS PRN
Qty: 28 TABLET | Refills: 0 | Status: SHIPPED | OUTPATIENT
Start: 2024-06-17 | End: 2024-07-01

## 2024-06-17 RX ORDER — OMEPRAZOLE 40 MG/1
40 CAPSULE, DELAYED RELEASE ORAL
Qty: 90 CAPSULE | Refills: 0 | Status: SHIPPED | OUTPATIENT
Start: 2024-06-17 | End: 2024-09-15

## 2024-07-08 DIAGNOSIS — E29.1 HYPOGONADISM IN MALE: ICD-10-CM

## 2024-07-09 RX ORDER — TESTOSTERONE 20.25 MG/1.25G
GEL TOPICAL
Qty: 75 G | Refills: 0 | Status: SHIPPED | OUTPATIENT
Start: 2024-07-09

## 2024-07-09 NOTE — TELEPHONE ENCOUNTER
This patient is due for 3 month follow up appointment. Please sent him a [place] link to schedule one

## 2024-08-01 DIAGNOSIS — M54.50 SEVERE LOW BACK PAIN: ICD-10-CM

## 2024-08-01 DIAGNOSIS — E29.1 HYPOGONADISM IN MALE: ICD-10-CM

## 2024-08-01 RX ORDER — IBUPROFEN 800 MG/1
800 TABLET ORAL EVERY 8 HOURS PRN
Qty: 28 TABLET | Refills: 0 | Status: SHIPPED | OUTPATIENT
Start: 2024-08-01 | End: 2024-08-15

## 2024-08-01 RX ORDER — TESTOSTERONE 20.25 MG/1.25G
GEL TOPICAL
Qty: 75 G | Refills: 0 | Status: SHIPPED | OUTPATIENT
Start: 2024-08-01

## 2025-05-19 ENCOUNTER — TRANSCRIBE ORDERS (OUTPATIENT)
Dept: ADMINISTRATIVE | Age: 55
End: 2025-05-19

## 2025-05-19 DIAGNOSIS — R59.0 INGUINAL LYMPHADENOPATHY: Primary | ICD-10-CM
